# Patient Record
Sex: FEMALE | Race: WHITE | HISPANIC OR LATINO
[De-identification: names, ages, dates, MRNs, and addresses within clinical notes are randomized per-mention and may not be internally consistent; named-entity substitution may affect disease eponyms.]

---

## 2017-04-17 ENCOUNTER — RESULT REVIEW (OUTPATIENT)
Age: 27
End: 2017-04-17

## 2017-12-07 ENCOUNTER — TRANSCRIPTION ENCOUNTER (OUTPATIENT)
Age: 27
End: 2017-12-07

## 2017-12-07 ENCOUNTER — INPATIENT (INPATIENT)
Facility: HOSPITAL | Age: 27
LOS: 2 days | Discharge: HOME | End: 2017-12-10
Payer: COMMERCIAL

## 2017-12-07 VITALS — HEIGHT: 65 IN | WEIGHT: 216.05 LBS

## 2017-12-07 DIAGNOSIS — O26.893 OTHER SPECIFIED PREGNANCY RELATED CONDITIONS, THIRD TRIMESTER: ICD-10-CM

## 2017-12-07 DIAGNOSIS — O47.1 FALSE LABOR AT OR AFTER 37 COMPLETED WEEKS OF GESTATION: ICD-10-CM

## 2017-12-07 LAB
ABO RH CONFIRMATION: SIGNIFICANT CHANGE UP
APPEARANCE UR: CLEAR — SIGNIFICANT CHANGE UP
BASE EXCESS BLDCOA CALC-SCNC: -5.7 MMOL/L — LOW (ref -2–2)
BASE EXCESS BLDCOV CALC-SCNC: -4.2 MMOL/L — LOW (ref -2–2)
BASOPHILS # BLD AUTO: 0 K/UL — SIGNIFICANT CHANGE UP (ref 0–0.2)
BASOPHILS NFR BLD AUTO: 0.1 % — SIGNIFICANT CHANGE UP (ref 0–2)
BILIRUB UR-MCNC: NEGATIVE — SIGNIFICANT CHANGE UP
BLD GP AB SCN SERPL QL: SIGNIFICANT CHANGE UP
COLOR SPEC: YELLOW — SIGNIFICANT CHANGE UP
DIFF PNL FLD: NEGATIVE — SIGNIFICANT CHANGE UP
EOSINOPHIL # BLD AUTO: 0.1 K/UL — SIGNIFICANT CHANGE UP (ref 0–0.5)
EOSINOPHIL NFR BLD AUTO: 1 % — SIGNIFICANT CHANGE UP (ref 0–6)
GAS PNL BLDCOV: 7.25 — SIGNIFICANT CHANGE UP (ref 7.25–7.45)
GLUCOSE UR QL: NEGATIVE MG/DL — SIGNIFICANT CHANGE UP
HCO3 BLDCOA-SCNC: 18 MMOL/L — LOW (ref 21–29)
HCO3 BLDCOV-SCNC: 19 MMOL/L — LOW (ref 21–29)
HCT VFR BLD CALC: 36.7 % — LOW (ref 37–47)
HGB BLD-MCNC: 12.3 G/DL — SIGNIFICANT CHANGE UP (ref 12–16)
KETONES UR-MCNC: NEGATIVE — SIGNIFICANT CHANGE UP
LEUKOCYTE ESTERASE UR-ACNC: NEGATIVE — SIGNIFICANT CHANGE UP
LYMPHOCYTES # BLD AUTO: 2.2 K/UL — SIGNIFICANT CHANGE UP (ref 1–4.8)
LYMPHOCYTES # BLD AUTO: 23.5 % — SIGNIFICANT CHANGE UP (ref 20–55)
MCHC RBC-ENTMCNC: 26.7 PG — LOW (ref 27–31)
MCHC RBC-ENTMCNC: 33.5 G/DL — SIGNIFICANT CHANGE UP (ref 32–36)
MCV RBC AUTO: 79.8 FL — LOW (ref 81–99)
MONOCYTES # BLD AUTO: 0.7 K/UL — SIGNIFICANT CHANGE UP (ref 0–0.8)
MONOCYTES NFR BLD AUTO: 7.1 % — SIGNIFICANT CHANGE UP (ref 3–10)
NEUTROPHILS # BLD AUTO: 6.3 K/UL — SIGNIFICANT CHANGE UP (ref 1.8–8)
NEUTROPHILS NFR BLD AUTO: 67.7 % — SIGNIFICANT CHANGE UP (ref 37–73)
NITRITE UR-MCNC: NEGATIVE — SIGNIFICANT CHANGE UP
PCO2 BLDCOA: 59 MMHG — SIGNIFICANT CHANGE UP (ref 32–68)
PCO2 BLDCOV: 54.8 MMHG — HIGH (ref 29–53)
PH BLDCOA: 7.21 — SIGNIFICANT CHANGE UP (ref 7.18–7.38)
PH UR: 6 — SIGNIFICANT CHANGE UP (ref 5–8)
PLATELET # BLD AUTO: 236 K/UL — SIGNIFICANT CHANGE UP (ref 150–400)
PO2 BLDCOA: 13.3 MMHG — SIGNIFICANT CHANGE UP (ref 5.7–30.5)
PO2 BLDCOA: 18.2 MMHG — SIGNIFICANT CHANGE UP (ref 17–41)
PROT UR-MCNC: NEGATIVE MG/DL — SIGNIFICANT CHANGE UP
RBC # BLD: 4.6 M/UL — SIGNIFICANT CHANGE UP (ref 4.4–5.2)
RBC # FLD: 14.9 % — SIGNIFICANT CHANGE UP (ref 11–15.6)
SAO2 % BLDCOA: SIGNIFICANT CHANGE UP
SAO2 % BLDCOV: SIGNIFICANT CHANGE UP
SP GR SPEC: 1.01 — SIGNIFICANT CHANGE UP (ref 1.01–1.02)
UROBILINOGEN FLD QL: NEGATIVE MG/DL — SIGNIFICANT CHANGE UP
WBC # BLD: 9.4 K/UL — SIGNIFICANT CHANGE UP (ref 4.8–10.8)
WBC # FLD AUTO: 9.4 K/UL — SIGNIFICANT CHANGE UP (ref 4.8–10.8)

## 2017-12-07 PROCEDURE — 59514 CESAREAN DELIVERY ONLY: CPT | Mod: 80

## 2017-12-07 RX ORDER — OXYCODONE AND ACETAMINOPHEN 5; 325 MG/1; MG/1
2 TABLET ORAL EVERY 6 HOURS
Qty: 0 | Refills: 0 | Status: DISCONTINUED | OUTPATIENT
Start: 2017-12-07 | End: 2017-12-10

## 2017-12-07 RX ORDER — OXYTOCIN 10 UNIT/ML
333.33 VIAL (ML) INJECTION
Qty: 20 | Refills: 0 | Status: DISCONTINUED | OUTPATIENT
Start: 2017-12-07 | End: 2017-12-10

## 2017-12-07 RX ORDER — NALOXONE HYDROCHLORIDE 4 MG/.1ML
0.1 SPRAY NASAL
Qty: 0 | Refills: 0 | Status: DISCONTINUED | OUTPATIENT
Start: 2017-12-07 | End: 2017-12-10

## 2017-12-07 RX ORDER — SODIUM CHLORIDE 9 MG/ML
500 INJECTION, SOLUTION INTRAVENOUS ONCE
Qty: 0 | Refills: 0 | Status: COMPLETED | OUTPATIENT
Start: 2017-12-07 | End: 2017-12-07

## 2017-12-07 RX ORDER — DIPHENHYDRAMINE HCL 50 MG
25 CAPSULE ORAL EVERY 6 HOURS
Qty: 0 | Refills: 0 | Status: DISCONTINUED | OUTPATIENT
Start: 2017-12-07 | End: 2017-12-10

## 2017-12-07 RX ORDER — ACETAMINOPHEN 500 MG
650 TABLET ORAL EVERY 6 HOURS
Qty: 0 | Refills: 0 | Status: DISCONTINUED | OUTPATIENT
Start: 2017-12-07 | End: 2017-12-10

## 2017-12-07 RX ORDER — OXYCODONE AND ACETAMINOPHEN 5; 325 MG/1; MG/1
1 TABLET ORAL
Qty: 0 | Refills: 0 | Status: DISCONTINUED | OUTPATIENT
Start: 2017-12-07 | End: 2017-12-10

## 2017-12-07 RX ORDER — LANOLIN
1 OINTMENT (GRAM) TOPICAL
Qty: 0 | Refills: 0 | Status: DISCONTINUED | OUTPATIENT
Start: 2017-12-07 | End: 2017-12-10

## 2017-12-07 RX ORDER — SODIUM CHLORIDE 9 MG/ML
1000 INJECTION, SOLUTION INTRAVENOUS
Qty: 0 | Refills: 0 | Status: DISCONTINUED | OUTPATIENT
Start: 2017-12-07 | End: 2017-12-07

## 2017-12-07 RX ORDER — ACETAMINOPHEN 500 MG
1000 TABLET ORAL ONCE
Qty: 0 | Refills: 0 | Status: DISCONTINUED | OUTPATIENT
Start: 2017-12-07 | End: 2017-12-10

## 2017-12-07 RX ORDER — GLYCERIN ADULT
1 SUPPOSITORY, RECTAL RECTAL AT BEDTIME
Qty: 0 | Refills: 0 | Status: DISCONTINUED | OUTPATIENT
Start: 2017-12-07 | End: 2017-12-10

## 2017-12-07 RX ORDER — DIPHENHYDRAMINE HCL 50 MG
25 CAPSULE ORAL EVERY 4 HOURS
Qty: 0 | Refills: 0 | Status: DISCONTINUED | OUTPATIENT
Start: 2017-12-07 | End: 2017-12-10

## 2017-12-07 RX ORDER — IBUPROFEN 200 MG
600 TABLET ORAL EVERY 6 HOURS
Qty: 0 | Refills: 0 | Status: DISCONTINUED | OUTPATIENT
Start: 2017-12-07 | End: 2017-12-10

## 2017-12-07 RX ORDER — SODIUM CHLORIDE 9 MG/ML
1000 INJECTION, SOLUTION INTRAVENOUS ONCE
Qty: 0 | Refills: 0 | Status: COMPLETED | OUTPATIENT
Start: 2017-12-07 | End: 2017-12-07

## 2017-12-07 RX ORDER — ONDANSETRON 8 MG/1
4 TABLET, FILM COATED ORAL EVERY 6 HOURS
Qty: 0 | Refills: 0 | Status: DISCONTINUED | OUTPATIENT
Start: 2017-12-07 | End: 2017-12-07

## 2017-12-07 RX ORDER — SIMETHICONE 80 MG/1
80 TABLET, CHEWABLE ORAL EVERY 4 HOURS
Qty: 0 | Refills: 0 | Status: DISCONTINUED | OUTPATIENT
Start: 2017-12-07 | End: 2017-12-10

## 2017-12-07 RX ORDER — OXYTOCIN 10 UNIT/ML
41.67 VIAL (ML) INJECTION
Qty: 20 | Refills: 0 | Status: DISCONTINUED | OUTPATIENT
Start: 2017-12-07 | End: 2017-12-07

## 2017-12-07 RX ORDER — CITRIC ACID/SODIUM CITRATE 300-500 MG
30 SOLUTION, ORAL ORAL ONCE
Qty: 0 | Refills: 0 | Status: COMPLETED | OUTPATIENT
Start: 2017-12-07 | End: 2017-12-07

## 2017-12-07 RX ORDER — OXYTOCIN 10 UNIT/ML
41.67 VIAL (ML) INJECTION
Qty: 20 | Refills: 0 | Status: DISCONTINUED | OUTPATIENT
Start: 2017-12-07 | End: 2017-12-10

## 2017-12-07 RX ORDER — KETOROLAC TROMETHAMINE 30 MG/ML
30 SYRINGE (ML) INJECTION EVERY 6 HOURS
Qty: 0 | Refills: 0 | Status: DISCONTINUED | OUTPATIENT
Start: 2017-12-07 | End: 2017-12-08

## 2017-12-07 RX ORDER — FERROUS SULFATE 325(65) MG
325 TABLET ORAL DAILY
Qty: 0 | Refills: 0 | Status: DISCONTINUED | OUTPATIENT
Start: 2017-12-07 | End: 2017-12-10

## 2017-12-07 RX ORDER — KETOROLAC TROMETHAMINE 30 MG/ML
30 SYRINGE (ML) INJECTION ONCE
Qty: 0 | Refills: 0 | Status: DISCONTINUED | OUTPATIENT
Start: 2017-12-07 | End: 2017-12-07

## 2017-12-07 RX ORDER — ONDANSETRON 8 MG/1
4 TABLET, FILM COATED ORAL EVERY 6 HOURS
Qty: 0 | Refills: 0 | Status: DISCONTINUED | OUTPATIENT
Start: 2017-12-07 | End: 2017-12-10

## 2017-12-07 RX ORDER — TETANUS TOXOID, REDUCED DIPHTHERIA TOXOID AND ACELLULAR PERTUSSIS VACCINE, ADSORBED 5; 2.5; 8; 8; 2.5 [IU]/.5ML; [IU]/.5ML; UG/.5ML; UG/.5ML; UG/.5ML
0.5 SUSPENSION INTRAMUSCULAR ONCE
Qty: 0 | Refills: 0 | Status: COMPLETED | OUTPATIENT
Start: 2017-12-07

## 2017-12-07 RX ORDER — OXYTOCIN 10 UNIT/ML
333.33 VIAL (ML) INJECTION
Qty: 20 | Refills: 0 | Status: DISCONTINUED | OUTPATIENT
Start: 2017-12-07 | End: 2017-12-07

## 2017-12-07 RX ORDER — GENTAMICIN SULFATE 40 MG/ML
120 VIAL (ML) INJECTION ONCE
Qty: 0 | Refills: 0 | Status: DISCONTINUED | OUTPATIENT
Start: 2017-12-07 | End: 2017-12-07

## 2017-12-07 RX ORDER — SODIUM CHLORIDE 9 MG/ML
1000 INJECTION, SOLUTION INTRAVENOUS
Qty: 0 | Refills: 0 | Status: DISCONTINUED | OUTPATIENT
Start: 2017-12-07 | End: 2017-12-10

## 2017-12-07 RX ORDER — NALBUPHINE HYDROCHLORIDE 10 MG/ML
5 INJECTION, SOLUTION INTRAMUSCULAR; INTRAVENOUS; SUBCUTANEOUS EVERY 6 HOURS
Qty: 0 | Refills: 0 | Status: DISCONTINUED | OUTPATIENT
Start: 2017-12-07 | End: 2017-12-10

## 2017-12-07 RX ORDER — SODIUM CHLORIDE 9 MG/ML
500 INJECTION, SOLUTION INTRAVENOUS
Qty: 0 | Refills: 0 | Status: DISCONTINUED | OUTPATIENT
Start: 2017-12-07 | End: 2017-12-07

## 2017-12-07 RX ORDER — DOCUSATE SODIUM 100 MG
100 CAPSULE ORAL
Qty: 0 | Refills: 0 | Status: DISCONTINUED | OUTPATIENT
Start: 2017-12-07 | End: 2017-12-10

## 2017-12-07 RX ADMIN — SODIUM CHLORIDE 1000 MILLILITER(S): 9 INJECTION, SOLUTION INTRAVENOUS at 12:03

## 2017-12-07 RX ADMIN — Medication 30 MILLILITER(S): at 15:18

## 2017-12-07 RX ADMIN — Medication 100 MILLIGRAM(S): at 15:59

## 2017-12-07 RX ADMIN — Medication 125 MILLIUNIT(S)/MIN: at 18:57

## 2017-12-07 RX ADMIN — Medication 125 MILLIUNIT(S)/MIN: at 21:30

## 2017-12-07 RX ADMIN — SODIUM CHLORIDE 2000 MILLILITER(S): 9 INJECTION, SOLUTION INTRAVENOUS at 15:00

## 2017-12-07 RX ADMIN — SODIUM CHLORIDE 125 MILLILITER(S): 9 INJECTION, SOLUTION INTRAVENOUS at 09:24

## 2017-12-07 RX ADMIN — Medication 200 MILLIGRAM(S): at 16:31

## 2017-12-07 RX ADMIN — SODIUM CHLORIDE 125 MILLILITER(S): 9 INJECTION, SOLUTION INTRAVENOUS at 04:30

## 2017-12-07 RX ADMIN — Medication 125 MILLIUNIT(S)/MIN: at 17:00

## 2017-12-07 RX ADMIN — Medication 1000 MILLIUNIT(S)/MIN: at 16:31

## 2017-12-08 LAB
BASOPHILS # BLD AUTO: 0 K/UL — SIGNIFICANT CHANGE UP (ref 0–0.2)
BASOPHILS NFR BLD AUTO: 0.1 % — SIGNIFICANT CHANGE UP (ref 0–2)
EOSINOPHIL # BLD AUTO: 0.1 K/UL — SIGNIFICANT CHANGE UP (ref 0–0.5)
EOSINOPHIL NFR BLD AUTO: 0.9 % — SIGNIFICANT CHANGE UP (ref 0–6)
HCT VFR BLD CALC: 33.9 % — LOW (ref 37–47)
HGB BLD-MCNC: 10.8 G/DL — LOW (ref 12–16)
LYMPHOCYTES # BLD AUTO: 2 K/UL — SIGNIFICANT CHANGE UP (ref 1–4.8)
LYMPHOCYTES # BLD AUTO: 20.1 % — SIGNIFICANT CHANGE UP (ref 20–55)
MCHC RBC-ENTMCNC: 25.8 PG — LOW (ref 27–31)
MCHC RBC-ENTMCNC: 31.9 G/DL — LOW (ref 32–36)
MCV RBC AUTO: 80.9 FL — LOW (ref 81–99)
MONOCYTES # BLD AUTO: 0.7 K/UL — SIGNIFICANT CHANGE UP (ref 0–0.8)
MONOCYTES NFR BLD AUTO: 6.8 % — SIGNIFICANT CHANGE UP (ref 3–10)
NEUTROPHILS # BLD AUTO: 7.2 K/UL — SIGNIFICANT CHANGE UP (ref 1.8–8)
NEUTROPHILS NFR BLD AUTO: 72 % — SIGNIFICANT CHANGE UP (ref 37–73)
PLATELET # BLD AUTO: 209 K/UL — SIGNIFICANT CHANGE UP (ref 150–400)
RBC # BLD: 4.19 M/UL — LOW (ref 4.4–5.2)
RBC # FLD: 14.9 % — SIGNIFICANT CHANGE UP (ref 11–15.6)
T PALLIDUM AB TITR SER: NEGATIVE — SIGNIFICANT CHANGE UP
WBC # BLD: 9.9 K/UL — SIGNIFICANT CHANGE UP (ref 4.8–10.8)
WBC # FLD AUTO: 9.9 K/UL — SIGNIFICANT CHANGE UP (ref 4.8–10.8)

## 2017-12-08 RX ORDER — TETANUS TOXOID, REDUCED DIPHTHERIA TOXOID AND ACELLULAR PERTUSSIS VACCINE, ADSORBED 5; 2.5; 8; 8; 2.5 [IU]/.5ML; [IU]/.5ML; UG/.5ML; UG/.5ML; UG/.5ML
0.5 SUSPENSION INTRAMUSCULAR ONCE
Qty: 0 | Refills: 0 | Status: COMPLETED | OUTPATIENT
Start: 2017-12-08 | End: 2017-12-08

## 2017-12-08 RX ADMIN — Medication 100 MILLIGRAM(S): at 00:22

## 2017-12-08 RX ADMIN — Medication 30 MILLIGRAM(S): at 06:06

## 2017-12-08 RX ADMIN — SODIUM CHLORIDE 125 MILLILITER(S): 9 INJECTION, SOLUTION INTRAVENOUS at 00:22

## 2017-12-08 RX ADMIN — Medication 1 TABLET(S): at 18:28

## 2017-12-08 RX ADMIN — Medication 30 MILLIGRAM(S): at 06:20

## 2017-12-08 RX ADMIN — Medication 30 MILLIGRAM(S): at 00:22

## 2017-12-08 RX ADMIN — Medication 100 MILLIGRAM(S): at 09:04

## 2017-12-08 RX ADMIN — Medication 600 MILLIGRAM(S): at 18:28

## 2017-12-08 RX ADMIN — Medication 30 MILLIGRAM(S): at 00:36

## 2017-12-08 RX ADMIN — Medication 325 MILLIGRAM(S): at 18:28

## 2017-12-08 RX ADMIN — TETANUS TOXOID, REDUCED DIPHTHERIA TOXOID AND ACELLULAR PERTUSSIS VACCINE, ADSORBED 0.5 MILLILITER(S): 5; 2.5; 8; 8; 2.5 SUSPENSION INTRAMUSCULAR at 22:57

## 2017-12-08 NOTE — PROGRESS NOTE ADULT - ASSESSMENT
26yo  POD#1 s/p  delivery, stable.     1. Continue with postpartum/postop care.   2. GI: regular diet  3. : d/c gomez, due to void  4. Neuro: Pain PRN medications  5. DVT ppx: ambulation  6. Anticipate discharge on POD# 3 or 4 if meets criteria 28yo  POD#1 s/p  delivery, stable.     1. Continue with postpartum/postop care.   2. GI: regular diet  3. : d/c gomez, due to void  4. Neuro: Pain PRN medications  5. DVT ppx: ambulation  6. Anticipate discharge on POD# 3 or 4 if meets criteria     Coverage attending addendum    Pt seen; doing well; desires circ; risks of circ discussed with parents including risk of bleeding and infection

## 2017-12-09 ENCOUNTER — TRANSCRIPTION ENCOUNTER (OUTPATIENT)
Age: 27
End: 2017-12-09

## 2017-12-09 RX ORDER — IBUPROFEN 200 MG
1 TABLET ORAL
Qty: 120 | Refills: 0
Start: 2017-12-09 | End: 2018-01-08

## 2017-12-09 RX ADMIN — Medication 1 TABLET(S): at 10:33

## 2017-12-09 RX ADMIN — Medication 600 MILLIGRAM(S): at 02:42

## 2017-12-09 RX ADMIN — Medication 600 MILLIGRAM(S): at 16:42

## 2017-12-09 RX ADMIN — Medication 600 MILLIGRAM(S): at 17:40

## 2017-12-09 RX ADMIN — Medication 600 MILLIGRAM(S): at 10:33

## 2017-12-09 RX ADMIN — Medication 325 MILLIGRAM(S): at 10:33

## 2017-12-09 RX ADMIN — Medication 600 MILLIGRAM(S): at 03:42

## 2017-12-09 RX ADMIN — Medication 600 MILLIGRAM(S): at 11:15

## 2017-12-09 NOTE — DISCHARGE NOTE OB - CARE PROVIDER_API CALL
Yesy Montalvo), Obstetrics and Gynecology  [address] [place] [address]  Phone: [phone]  Fax: [phone]

## 2017-12-09 NOTE — DISCHARGE NOTE OB - CARE PLAN
Principal Discharge DX:	 delivery delivered  Goal:	pain free  Instructions for follow-up, activity and diet:	Please follow up in our office in 5 days for staple removal.  Please call sooner if there are any additional concerns.

## 2017-12-09 NOTE — DISCHARGE NOTE OB - PATIENT PORTAL LINK FT
“You can access the Follow[facility] Patient Portal, offered by [facility], by registering with the following website: [email or web address]”

## 2017-12-09 NOTE — PROGRESS NOTE ADULT - ASSESSMENT
A/P:  Patient is a 28yo  now POD#2 s/p  section   -Stable  -Voiding, tolerating PO, bowel function nml   -Advance care as tolerated   -Continue routine postpartum/postoperative care and education.

## 2017-12-09 NOTE — DISCHARGE NOTE OB - HOSPITAL COURSE
Patient is a 26yo  who delivered via primary  section. She was transferred to postpartum unit without complications during her stay. Upon discharge she is voiding, tolerating PO, ambulating, and pain is controlled.

## 2017-12-09 NOTE — DISCHARGE NOTE OB - PLAN OF CARE
pain free Please follow up in our office in 5 days for staple removal.  Please call sooner if there are any additional concerns.

## 2017-12-09 NOTE — DISCHARGE NOTE OB - MEDICATION SUMMARY - MEDICATIONS TO TAKE
I will START or STAY ON the medications listed below when I get home from the hospital:    ibuprofen 600 mg oral tablet  -- 1 tab(s) by mouth every 6 hours, As needed, Mild pain or headache  -- Indication: For moderate pain    oxyCODONE-acetaminophen 5 mg-325 mg oral tablet  -- 1 tab(s) by mouth every 6 hours MDD:4  -- Caution federal law prohibits the transfer of this drug to any person other  than the person for whom it was prescribed.  May cause drowsiness.  Alcohol may intensify this effect.  Use care when operating dangerous machinery.  This prescription cannot be refilled.  This product contains acetaminophen.  Do not use  with any other product containing acetaminophen to prevent possible liver damage.  Using more of this medication than prescribed may cause serious breathing problems.    -- Indication: For severe pain    Prenatal Multivitamins with Folic Acid 1 mg oral tablet  -- 1 tab(s) by mouth once a day  -- Indication: For home med

## 2017-12-10 VITALS — SYSTOLIC BLOOD PRESSURE: 128 MMHG | DIASTOLIC BLOOD PRESSURE: 76 MMHG | TEMPERATURE: 98 F

## 2017-12-10 PROCEDURE — 86780 TREPONEMA PALLIDUM: CPT

## 2017-12-10 PROCEDURE — 36415 COLL VENOUS BLD VENIPUNCTURE: CPT

## 2017-12-10 PROCEDURE — 86850 RBC ANTIBODY SCREEN: CPT

## 2017-12-10 PROCEDURE — 86901 BLOOD TYPING SEROLOGIC RH(D): CPT

## 2017-12-10 PROCEDURE — 85027 COMPLETE CBC AUTOMATED: CPT

## 2017-12-10 PROCEDURE — 82803 BLOOD GASES ANY COMBINATION: CPT

## 2017-12-10 PROCEDURE — 90715 TDAP VACCINE 7 YRS/> IM: CPT

## 2017-12-10 PROCEDURE — 81003 URINALYSIS AUTO W/O SCOPE: CPT

## 2017-12-10 PROCEDURE — 86900 BLOOD TYPING SEROLOGIC ABO: CPT

## 2017-12-10 RX ADMIN — Medication 325 MILLIGRAM(S): at 11:06

## 2017-12-10 RX ADMIN — Medication 600 MILLIGRAM(S): at 00:56

## 2017-12-10 RX ADMIN — Medication 600 MILLIGRAM(S): at 01:56

## 2017-12-10 RX ADMIN — Medication 600 MILLIGRAM(S): at 11:11

## 2017-12-10 RX ADMIN — Medication 600 MILLIGRAM(S): at 12:00

## 2017-12-10 RX ADMIN — Medication 1 TABLET(S): at 11:06

## 2017-12-10 NOTE — PROGRESS NOTE ADULT - SUBJECTIVE AND OBJECTIVE BOX
POD #2    S: patient doing well, no complaints, tolerating diet, pain controlled    O: Vital Signs Last 24 Hrs  T(C): 36.8 (08 Dec 2017 19:00), Max: 36.8 (08 Dec 2017 16:45)  T(F): 98.2 (08 Dec 2017 19:00), Max: 98.3 (08 Dec 2017 16:45)  HR: 73 (08 Dec 2017 19:00) (73 - 93)  BP: 120/72 (08 Dec 2017 19:00) (116/69 - 120/72)  BP(mean): --  RR: 18 (08 Dec 2017 19:00) (18 - 18)  SpO2: --               breasts - not engorged       abdomen - soft, nontender       incision - clean, dry, intact       fundus - firm       lochia - minimal       extremities - no calf tenderness                            10.8   9.9   )-----------( 209      ( 08 Dec 2017 07:30 )             33.9               A: POD #2 S/P C/S    P: continue present management
POD# 3  s/p       section    s: no complaints; dmitri reg diet    vital signs  Vital Signs Last 24 Hrs  T(C): 36.7 (10 Dec 2017 10:24), Max: 37 (10 Dec 2017 08:46)  T(F): 98.1 (10 Dec 2017 10:24), Max: 98.6 (10 Dec 2017 08:46)  HR: 85 (10 Dec 2017 08:46) (78 - 95)  BP: 128/76 (10 Dec 2017 10:24) (128/76 - 139/64)  BP(mean): --  RR: 20 (10 Dec 2017 08:46) (16 - 20)  SpO2: --    abd: soft  inc:c/d/i          cbc:
Patient is a 26yo  now POD#2 s/p  section    S:    The patient has no complaints.  She is ambulating and tolerating diet   + flatus/+BM     O:    T(C): 36.8 (17 @ 19:00), Max: 36.9 (17 @ 08:12)  HR: 73 (17 @ 19:00) (73 - 93)  BP: 120/72 (17 @ 19:00) (107/71 - 120/72)  RR: 18 (17 @ 19:00) (18 - 18)  Abdomen:  soft, non-tender, non-distended, +bowel sounds.  Uterus:  Fundus firm below umbilicus  Incision:  Clean/dry/intact  VE:  +lochia  Ext:  Non-tender.                          10.8   9.9   )-----------( 209      ( 08 Dec 2017 07:30 )             33.9
S/P  with spinal anesthesia POD#1   No complaints, VSS, MAEx4, ambulating.   Denies any PONV, headache or paresthesia.   No numbness or weakness noted.  Pain management satisfactory.   No apparent anesthesia complications noted.
S/P  with spinal anesthesia POD#1   No complaints, VSS, MAEx4, ambulating.   Denies any PONV, headache or paresthesia.   No numbness or weakness noted.  Pain management satisfactory.   No apparent anesthesia complications noted.
Postpartum Note,  Section  She is a  27y woman who is now post-operative day: 1    Subjective:  Patient was seen and examined at bedside. No acute events.   The patient feels well.  Patient is pending d/c of gomez. +Flatus, -BM.   She is tolerating regular diet.  She denies nausea and vomiting.  Her pain is controlled.  She reports normal postpartum bleeding.  She is breastfeeding.    Vital Signs Last 24 Hrs  T(C): 36.8 (08 Dec 2017 05:45), Max: 36.8 (07 Dec 2017 19:10)  T(F): 98.2 (08 Dec 2017 05:45), Max: 98.2 (07 Dec 2017 19:10)  HR: 74 (08 Dec 2017 05:45) (71 - 108)  BP: 128/78 (08 Dec 2017 05:45) (99/41 - 130/67)  BP(mean): --  RR: 19 (08 Dec 2017 05:45) (12 - 20)  SpO2: 98% (07 Dec 2017 19:10) (96% - 99%)    Gen: NAD  Breast: Soft, nontender, not engorged.  Abdomen: Soft, nontender, no distension , firm uterine fundus at umbilicus.  Incision: Clean, dry, and intact with steri strips,had her bowel movement  Pelvic: Normal lochia noted  Ext: No calf tenderness    LABS:                        12.3   9.4   )-----------( 236      ( 07 Dec 2017 04:51 )             36.7     Rubella status: immune    no known allergies

## 2017-12-12 ENCOUNTER — TRANSCRIPTION ENCOUNTER (OUTPATIENT)
Age: 27
End: 2017-12-12

## 2017-12-16 ENCOUNTER — EMERGENCY (EMERGENCY)
Facility: HOSPITAL | Age: 27
LOS: 1 days | Discharge: HOME | End: 2017-12-16
Attending: EMERGENCY MEDICINE | Admitting: EMERGENCY MEDICINE
Payer: COMMERCIAL

## 2017-12-16 VITALS
DIASTOLIC BLOOD PRESSURE: 82 MMHG | HEART RATE: 73 BPM | TEMPERATURE: 98 F | HEIGHT: 65 IN | OXYGEN SATURATION: 97 % | SYSTOLIC BLOOD PRESSURE: 125 MMHG | RESPIRATION RATE: 18 BRPM | WEIGHT: 175.05 LBS

## 2017-12-16 VITALS
DIASTOLIC BLOOD PRESSURE: 82 MMHG | RESPIRATION RATE: 17 BRPM | HEART RATE: 70 BPM | SYSTOLIC BLOOD PRESSURE: 132 MMHG | OXYGEN SATURATION: 98 %

## 2017-12-16 DIAGNOSIS — Z90.49 ACQUIRED ABSENCE OF OTHER SPECIFIED PARTS OF DIGESTIVE TRACT: ICD-10-CM

## 2017-12-16 DIAGNOSIS — Z88.1 ALLERGY STATUS TO OTHER ANTIBIOTIC AGENTS STATUS: ICD-10-CM

## 2017-12-16 DIAGNOSIS — Z79.891 LONG TERM (CURRENT) USE OF OPIATE ANALGESIC: ICD-10-CM

## 2017-12-16 DIAGNOSIS — T78.40XA ALLERGY, UNSPECIFIED, INITIAL ENCOUNTER: ICD-10-CM

## 2017-12-16 DIAGNOSIS — Z79.899 OTHER LONG TERM (CURRENT) DRUG THERAPY: ICD-10-CM

## 2017-12-16 DIAGNOSIS — Z79.1 LONG TERM (CURRENT) USE OF NON-STEROIDAL ANTI-INFLAMMATORIES (NSAID): ICD-10-CM

## 2017-12-16 DIAGNOSIS — Z90.49 ACQUIRED ABSENCE OF OTHER SPECIFIED PARTS OF DIGESTIVE TRACT: Chronic | ICD-10-CM

## 2017-12-16 DIAGNOSIS — Z88.0 ALLERGY STATUS TO PENICILLIN: ICD-10-CM

## 2017-12-16 DIAGNOSIS — R21 RASH AND OTHER NONSPECIFIC SKIN ERUPTION: ICD-10-CM

## 2017-12-16 DIAGNOSIS — Z98.890 OTHER SPECIFIED POSTPROCEDURAL STATES: ICD-10-CM

## 2017-12-16 LAB
ANION GAP SERPL CALC-SCNC: 13 MMOL/L — SIGNIFICANT CHANGE UP (ref 5–17)
BUN SERPL-MCNC: 13 MG/DL — SIGNIFICANT CHANGE UP (ref 8–20)
CALCIUM SERPL-MCNC: 9.3 MG/DL — SIGNIFICANT CHANGE UP (ref 8.6–10.2)
CHLORIDE SERPL-SCNC: 105 MMOL/L — SIGNIFICANT CHANGE UP (ref 98–107)
CO2 SERPL-SCNC: 23 MMOL/L — SIGNIFICANT CHANGE UP (ref 22–29)
CREAT SERPL-MCNC: 0.76 MG/DL — SIGNIFICANT CHANGE UP (ref 0.5–1.3)
GLUCOSE SERPL-MCNC: 97 MG/DL — SIGNIFICANT CHANGE UP (ref 70–115)
POTASSIUM SERPL-MCNC: 4.3 MMOL/L — SIGNIFICANT CHANGE UP (ref 3.5–5.3)
POTASSIUM SERPL-SCNC: 4.3 MMOL/L — SIGNIFICANT CHANGE UP (ref 3.5–5.3)
SODIUM SERPL-SCNC: 141 MMOL/L — SIGNIFICANT CHANGE UP (ref 135–145)

## 2017-12-16 PROCEDURE — 80048 BASIC METABOLIC PNL TOTAL CA: CPT

## 2017-12-16 PROCEDURE — 71275 CT ANGIOGRAPHY CHEST: CPT

## 2017-12-16 PROCEDURE — 96374 THER/PROPH/DIAG INJ IV PUSH: CPT | Mod: XU

## 2017-12-16 PROCEDURE — 99284 EMERGENCY DEPT VISIT MOD MDM: CPT

## 2017-12-16 PROCEDURE — 71275 CT ANGIOGRAPHY CHEST: CPT | Mod: 26

## 2017-12-16 PROCEDURE — 96376 TX/PRO/DX INJ SAME DRUG ADON: CPT | Mod: XU

## 2017-12-16 PROCEDURE — 99284 EMERGENCY DEPT VISIT MOD MDM: CPT | Mod: 25

## 2017-12-16 PROCEDURE — 96375 TX/PRO/DX INJ NEW DRUG ADDON: CPT | Mod: XU

## 2017-12-16 PROCEDURE — 36415 COLL VENOUS BLD VENIPUNCTURE: CPT

## 2017-12-16 RX ORDER — DIPHENHYDRAMINE HCL 50 MG
25 CAPSULE ORAL ONCE
Qty: 0 | Refills: 0 | Status: COMPLETED | OUTPATIENT
Start: 2017-12-16 | End: 2017-12-16

## 2017-12-16 RX ORDER — FAMOTIDINE 10 MG/ML
20 INJECTION INTRAVENOUS ONCE
Qty: 0 | Refills: 0 | Status: COMPLETED | OUTPATIENT
Start: 2017-12-16 | End: 2017-12-16

## 2017-12-16 RX ORDER — SODIUM CHLORIDE 9 MG/ML
3 INJECTION INTRAMUSCULAR; INTRAVENOUS; SUBCUTANEOUS ONCE
Qty: 0 | Refills: 0 | Status: COMPLETED | OUTPATIENT
Start: 2017-12-16 | End: 2017-12-16

## 2017-12-16 RX ADMIN — Medication 125 MILLIGRAM(S): at 14:22

## 2017-12-16 RX ADMIN — Medication 25 MILLIGRAM(S): at 14:22

## 2017-12-16 RX ADMIN — Medication 25 MILLIGRAM(S): at 18:36

## 2017-12-16 RX ADMIN — SODIUM CHLORIDE 3 MILLILITER(S): 9 INJECTION INTRAMUSCULAR; INTRAVENOUS; SUBCUTANEOUS at 14:34

## 2017-12-16 RX ADMIN — FAMOTIDINE 20 MILLIGRAM(S): 10 INJECTION INTRAVENOUS at 14:23

## 2017-12-16 NOTE — ED ADULT NURSE NOTE - CHIEF COMPLAINT QUOTE
Pt axox3 s/p   2017 at [facility]. C/O right sided breast pain with inhalation and is having a reaction to ibuprofen starting  2017. Pt presenting with generalized hives on abdomen /chest area . Pt states she took x2 benadryl to tall 50mg today at 1200.

## 2017-12-16 NOTE — ED STATDOCS - PROGRESS NOTE DETAILS
PA NOTE: Pt seen by intake physician and hpi/orders/plan reviewed. PT presenting to ED with complaints of pain on right side of chest upon deep inspiration - pt gave birth 1 week ago  PE: GEN: Awake, alert,  NAD,  EYES: PERRL CARDIAC: Reg rate and rhythm, S1,S2, RRR  RESP: No distress noted. Lungs CTA bilaterally no wheeze, ronchi, rales. ABD: soft,  non-tender, no guarding. . NEURO: AOx3, no focal deficits   PLAN: CT angio to r/o PE PA NOTE: Pt seen by intake physician and hpi/orders/plan reviewed. PT presenting to ED with complaints of pain on right side of chest upon deep inspiration - pt gave birth 1 week ago  Patient also c/o itchy rash on low abdomen x 3 days.  She states that rash developed after started keflex for cellulitis.  Patient has had allergic reaction to penicillin in the past.  Denies difficulty breathing or swallowing.  PE: GEN: Awake, alert,  NAD,  EYES: PERRL CARDIAC: Reg rate and rhythm, S1,S2, RRR  RESP: No distress noted. Lungs CTA bilaterally no wheeze, ronchi, rales. ABD: soft,  non-tender, no guarding. . NEURO: AOx3, no focal deficits   PLAN: CT angio to r/o PE pt states that her rash is spreading up the abdomen - denies difficulty breathing or swallowing

## 2017-12-16 NOTE — ED ADULT NURSE NOTE - OBJECTIVE STATEMENT
Pt presents to ED A&Ox3 with multiple medical complaints. Pt reports she had  on Thursday and had allergic reaction to tefla adhesive tape surrounding her incision site where she developed blisters. Pt reports she was placed on keflex and then had subsequent allergic reaction to the ABX. Pt has hives diffusely across abdomen, back and B/L breast. Pt appears uncomfortable, MD at bedside. Called placed to Yesy Montalvo. Pt presents to ED A&Ox3, reports she had  on Thursday and had allergic reaction to tefla adhesive tape surrounding her incision site where she developed blisters. Pt reports she was placed on keflex and then had subsequent allergic reaction to the ABX. Pt has hives diffusely across abdomen, back and B/L breast. Pt appears uncomfortable, MD at bedside. Called placed to Yesy Montalvo.

## 2017-12-16 NOTE — ED STATDOCS - OBJECTIVE STATEMENT
28 y/o F pt presents to ED s/p  2017 c/o diffuse itchy rash to abdomen and breast. Pt states she noticed blister rash on incision site; she had reaction to tape. Called her OB Dr. Montalvo placed on Keflex. Once she started taking the Keflex rash started, however blisters are healing. Pt is breastfeeding. No difficulty swallowing, no SOB. no further complaints at this time.

## 2017-12-16 NOTE — ED STATDOCS - ATTENDING CONTRIBUTION TO CARE
I, Silvia Gonzalez, performed the initial face to face bedside interview with this patient regarding history of present illness, review of symptoms and relevant past medical, social and family history.  I completed an independent physical examination.  I was the initial provider who evaluated this patient. I have signed out the follow up of any pending tests (i.e. labs, radiological studies) to the ACP.  I have communicated the patient’s plan of care and disposition with the ACP.  The history, relevant review of systems, past medical and surgical history, medical decision making, and physical examination was documented by the scribe in my presence and I attest to the accuracy of the documentation.

## 2017-12-16 NOTE — ED ADULT TRIAGE NOTE - CHIEF COMPLAINT QUOTE
Pt axox3 s/p   2017 at [facility]. C/O right sided breast pain with inhalation and is having a reaction to ibuprofen starting  2017. Pt presenting with generalized hives on abdomen /chest area . Pt states she took x2 benadryl to tall 50mg today at 1200.

## 2017-12-16 NOTE — ED STATDOCS - MEDICAL DECISION MAKING DETAILS
Allergic reaction; hives. Treat and re-evaluate pt Allergic reaction to keflex  Treat and re-evaluate pt. Switch abx to clindamycin as discussed with Dr. Montalvo (OB)

## 2018-01-20 ENCOUNTER — RESULT REVIEW (OUTPATIENT)
Age: 28
End: 2018-01-20

## 2018-12-11 ENCOUNTER — TRANSCRIPTION ENCOUNTER (OUTPATIENT)
Age: 28
End: 2018-12-11

## 2019-01-15 ENCOUNTER — RESULT REVIEW (OUTPATIENT)
Age: 29
End: 2019-01-15

## 2019-02-10 ENCOUNTER — TRANSCRIPTION ENCOUNTER (OUTPATIENT)
Age: 29
End: 2019-02-10

## 2019-03-25 NOTE — ED ADULT NURSE NOTE - NS ED NURSE LEVEL OF CONSCIOUSNESS SPEECH
States this was D/C on 1-9-2019 by Varsha Mcdonough MA. The D/C reason was Therapy Completed.      Please advise.   Speaking Coherently

## 2019-04-28 ENCOUNTER — OUTPATIENT (OUTPATIENT)
Dept: OUTPATIENT SERVICES | Facility: HOSPITAL | Age: 29
LOS: 1 days | End: 2019-04-28
Payer: COMMERCIAL

## 2019-04-28 VITALS — DIASTOLIC BLOOD PRESSURE: 63 MMHG | HEART RATE: 86 BPM | SYSTOLIC BLOOD PRESSURE: 95 MMHG

## 2019-04-28 VITALS — SYSTOLIC BLOOD PRESSURE: 113 MMHG | DIASTOLIC BLOOD PRESSURE: 64 MMHG | HEART RATE: 86 BPM

## 2019-04-28 DIAGNOSIS — Z90.49 ACQUIRED ABSENCE OF OTHER SPECIFIED PARTS OF DIGESTIVE TRACT: Chronic | ICD-10-CM

## 2019-04-28 DIAGNOSIS — K08.409 PARTIAL LOSS OF TEETH, UNSPECIFIED CAUSE, UNSPECIFIED CLASS: Chronic | ICD-10-CM

## 2019-04-28 DIAGNOSIS — O47.03 FALSE LABOR BEFORE 37 COMPLETED WEEKS OF GESTATION, THIRD TRIMESTER: ICD-10-CM

## 2019-04-28 PROCEDURE — G0463: CPT

## 2019-04-28 PROCEDURE — 59025 FETAL NON-STRESS TEST: CPT

## 2019-04-28 PROCEDURE — 59050 FETAL MONITOR W/REPORT: CPT

## 2019-04-28 NOTE — OB PROVIDER TRIAGE NOTE - HISTORY OF PRESENT ILLNESS
Patient is a 29yo  at 28 weeks and 1 day coming in with LUQ abdominal pain. Patient reports having diarrhea and cramping. Denies cxts, LOF, VB. +FM. Denies N/V, fevers.   Pregnancy uncomplicated.     PMH/PSH: T&A, deisy, CS  OBhx: CSX1    FHT: reactive   [place]: no cxts    A/P: Patient is a 29yo  at 28 weeks and 1 day coming in with LUQ abdominal pain.   FHT reactive without cxts, patient is not N/V  Will d/c home and recommend PO hydration and tylenol PRN for pain   Advised to call/return if develops N/V, fevers, of if pain becomes worse    D/W Dr. Montes

## 2019-04-28 NOTE — OB RN TRIAGE NOTE - NS_TRIAGETIMEOFATTENDNOTIFICATION_OBGYN_ALL_OB_DT
LM FOR THE PT, WOULD LIKE TO KNOW WHEN SHE IS LOOKING TO HAVE THE PROCEDURE DONE.   
Muna Huynh   Preferred Name:   None  Female, 49 year old, 1968  Phone:   *M:[phone]  Last Weight:   71.2 kg  PCP:   Dean Shields MD  Need Interp:   No  Language:   English  Allergies  Gabapentin  Nortriptyline  Wool Alcohol [Lanolin]  Primary Ins:   MEDICARE  MRN:   [id number]  my[place]:   Active  Next Appt With Me:   None  Last Appt With Me:       PLEASE ADJUST AND POST OP   Received: Today   Message Contents   Jas Wren Ob Reception Message Pool Wa/k   Cc: Tierra STEVENSON,     PLEASE ADJUST DR. MCKENZIE AND DR HUMMEL SCHEDULE ON 5/8, THEY WILL BE IN SURGERY AT 1015AM FOR 3HRS.   ALSO, PLEASE SCHEDULE THE PT WITH DR. HUMMEL FOR A 2 WEEK POST OP APPT.     THANK YOU,     JAS        
SPOKE WITH THE PT WHO AGREED TO DATE 5/8 AT 1015AM ARRIVAL 815AM    MAILED LETTER    SCHEDULED WITH KARMEN IN SURG SCHEDULING   
You Cooper MD at 3/9/2018  4:30 PM     Status: Signed      Recommending surgery be scheduled:next available   SHELLEY: No SHELLEY on file.  - Procedure(s): Lap total hyst, tubes, ovary, uterus <250 gm - [id number]  - Facility: [facility]  - Length of Procedure: 2 hours  - Assist: Any Available  - Type of Admit: Day Surgery  - PreOp Visit: No  - Medical Clearance from Primary Care Provider: No  - Bowel Prep Needed? no  - Special Equipment: No     - Post-op Appointment: 2 weeks  - Scheduling Comments: rOBOT          
28-Apr-2019 09:20

## 2019-05-01 ENCOUNTER — EMERGENCY (EMERGENCY)
Facility: HOSPITAL | Age: 29
LOS: 1 days | Discharge: HOME | End: 2019-05-01
Attending: EMERGENCY MEDICINE
Payer: COMMERCIAL

## 2019-05-01 ENCOUNTER — OUTPATIENT (OUTPATIENT)
Dept: OUTPATIENT SERVICES | Facility: HOSPITAL | Age: 29
LOS: 1 days | End: 2019-05-01
Payer: COMMERCIAL

## 2019-05-01 VITALS
TEMPERATURE: 99 F | SYSTOLIC BLOOD PRESSURE: 116 MMHG | HEIGHT: 65 IN | HEART RATE: 82 BPM | RESPIRATION RATE: 18 BRPM | OXYGEN SATURATION: 98 % | WEIGHT: 203.05 LBS | DIASTOLIC BLOOD PRESSURE: 58 MMHG

## 2019-05-01 VITALS
HEART RATE: 75 BPM | TEMPERATURE: 98 F | DIASTOLIC BLOOD PRESSURE: 64 MMHG | SYSTOLIC BLOOD PRESSURE: 128 MMHG | RESPIRATION RATE: 18 BRPM

## 2019-05-01 DIAGNOSIS — Z90.49 ACQUIRED ABSENCE OF OTHER SPECIFIED PARTS OF DIGESTIVE TRACT: Chronic | ICD-10-CM

## 2019-05-01 DIAGNOSIS — Z90.89 ACQUIRED ABSENCE OF OTHER ORGANS: Chronic | ICD-10-CM

## 2019-05-01 DIAGNOSIS — K08.409 PARTIAL LOSS OF TEETH, UNSPECIFIED CAUSE, UNSPECIFIED CLASS: Chronic | ICD-10-CM

## 2019-05-01 DIAGNOSIS — O47.02 FALSE LABOR BEFORE 37 COMPLETED WEEKS OF GESTATION, SECOND TRIMESTER: ICD-10-CM

## 2019-05-01 LAB
ALBUMIN SERPL ELPH-MCNC: 3.8 G/DL — SIGNIFICANT CHANGE UP (ref 3.3–5.2)
ALP SERPL-CCNC: 89 U/L — SIGNIFICANT CHANGE UP (ref 40–120)
ALT FLD-CCNC: 26 U/L — SIGNIFICANT CHANGE UP
ANION GAP SERPL CALC-SCNC: 14 MMOL/L — SIGNIFICANT CHANGE UP (ref 5–17)
APPEARANCE UR: CLEAR — SIGNIFICANT CHANGE UP
AST SERPL-CCNC: 18 U/L — SIGNIFICANT CHANGE UP
BASOPHILS # BLD AUTO: 0 K/UL — SIGNIFICANT CHANGE UP (ref 0–0.2)
BASOPHILS NFR BLD AUTO: 0.1 % — SIGNIFICANT CHANGE UP (ref 0–2)
BILIRUB SERPL-MCNC: <0.2 MG/DL — LOW (ref 0.4–2)
BILIRUB UR-MCNC: NEGATIVE — SIGNIFICANT CHANGE UP
BUN SERPL-MCNC: 8 MG/DL — SIGNIFICANT CHANGE UP (ref 8–20)
CALCIUM SERPL-MCNC: 9.8 MG/DL — SIGNIFICANT CHANGE UP (ref 8.6–10.2)
CHLORIDE SERPL-SCNC: 103 MMOL/L — SIGNIFICANT CHANGE UP (ref 98–107)
CO2 SERPL-SCNC: 21 MMOL/L — LOW (ref 22–29)
COLOR SPEC: YELLOW — SIGNIFICANT CHANGE UP
CREAT SERPL-MCNC: 0.51 MG/DL — SIGNIFICANT CHANGE UP (ref 0.5–1.3)
DIFF PNL FLD: ABNORMAL
EOSINOPHIL # BLD AUTO: 0.1 K/UL — SIGNIFICANT CHANGE UP (ref 0–0.5)
EOSINOPHIL NFR BLD AUTO: 1.8 % — SIGNIFICANT CHANGE UP (ref 0–6)
EPI CELLS # UR: SIGNIFICANT CHANGE UP
GLUCOSE SERPL-MCNC: 90 MG/DL — SIGNIFICANT CHANGE UP (ref 70–115)
GLUCOSE UR QL: NEGATIVE MG/DL — SIGNIFICANT CHANGE UP
HCT VFR BLD CALC: 36.3 % — LOW (ref 37–47)
HGB BLD-MCNC: 11.9 G/DL — LOW (ref 12–16)
KETONES UR-MCNC: NEGATIVE — SIGNIFICANT CHANGE UP
LEUKOCYTE ESTERASE UR-ACNC: NEGATIVE — SIGNIFICANT CHANGE UP
LIDOCAIN IGE QN: 46 U/L — SIGNIFICANT CHANGE UP (ref 22–51)
LYMPHOCYTES # BLD AUTO: 1.8 K/UL — SIGNIFICANT CHANGE UP (ref 1–4.8)
LYMPHOCYTES # BLD AUTO: 22.9 % — SIGNIFICANT CHANGE UP (ref 20–55)
MCHC RBC-ENTMCNC: 26.5 PG — LOW (ref 27–31)
MCHC RBC-ENTMCNC: 32.8 G/DL — SIGNIFICANT CHANGE UP (ref 32–36)
MCV RBC AUTO: 80.8 FL — LOW (ref 81–99)
MONOCYTES # BLD AUTO: 0.6 K/UL — SIGNIFICANT CHANGE UP (ref 0–0.8)
MONOCYTES NFR BLD AUTO: 7.8 % — SIGNIFICANT CHANGE UP (ref 3–10)
NEUTROPHILS # BLD AUTO: 5.2 K/UL — SIGNIFICANT CHANGE UP (ref 1.8–8)
NEUTROPHILS NFR BLD AUTO: 66.9 % — SIGNIFICANT CHANGE UP (ref 37–73)
NITRITE UR-MCNC: NEGATIVE — SIGNIFICANT CHANGE UP
PH UR: 6 — SIGNIFICANT CHANGE UP (ref 5–8)
PLATELET # BLD AUTO: 236 K/UL — SIGNIFICANT CHANGE UP (ref 150–400)
POTASSIUM SERPL-MCNC: 4 MMOL/L — SIGNIFICANT CHANGE UP (ref 3.5–5.3)
POTASSIUM SERPL-SCNC: 4 MMOL/L — SIGNIFICANT CHANGE UP (ref 3.5–5.3)
PROT SERPL-MCNC: 7.5 G/DL — SIGNIFICANT CHANGE UP (ref 6.6–8.7)
PROT UR-MCNC: 15 MG/DL
RBC # BLD: 4.49 M/UL — SIGNIFICANT CHANGE UP (ref 4.4–5.2)
RBC # FLD: 14 % — SIGNIFICANT CHANGE UP (ref 11–15.6)
RBC CASTS # UR COMP ASSIST: SIGNIFICANT CHANGE UP /HPF (ref 0–4)
SODIUM SERPL-SCNC: 138 MMOL/L — SIGNIFICANT CHANGE UP (ref 135–145)
SP GR SPEC: 1.01 — SIGNIFICANT CHANGE UP (ref 1.01–1.02)
UROBILINOGEN FLD QL: NEGATIVE MG/DL — SIGNIFICANT CHANGE UP
WBC # BLD: 7.8 K/UL — SIGNIFICANT CHANGE UP (ref 4.8–10.8)
WBC # FLD AUTO: 7.8 K/UL — SIGNIFICANT CHANGE UP (ref 4.8–10.8)
WBC UR QL: NEGATIVE — SIGNIFICANT CHANGE UP

## 2019-05-01 PROCEDURE — 85027 COMPLETE CBC AUTOMATED: CPT

## 2019-05-01 PROCEDURE — 81001 URINALYSIS AUTO W/SCOPE: CPT

## 2019-05-01 PROCEDURE — 99284 EMERGENCY DEPT VISIT MOD MDM: CPT | Mod: 25

## 2019-05-01 PROCEDURE — 76705 ECHO EXAM OF ABDOMEN: CPT

## 2019-05-01 PROCEDURE — 59025 FETAL NON-STRESS TEST: CPT

## 2019-05-01 PROCEDURE — 36415 COLL VENOUS BLD VENIPUNCTURE: CPT

## 2019-05-01 PROCEDURE — 87086 URINE CULTURE/COLONY COUNT: CPT

## 2019-05-01 PROCEDURE — 99284 EMERGENCY DEPT VISIT MOD MDM: CPT

## 2019-05-01 PROCEDURE — G0463: CPT

## 2019-05-01 PROCEDURE — 76815 OB US LIMITED FETUS(S): CPT

## 2019-05-01 PROCEDURE — 83690 ASSAY OF LIPASE: CPT

## 2019-05-01 PROCEDURE — 76815 OB US LIMITED FETUS(S): CPT | Mod: 26

## 2019-05-01 PROCEDURE — 59050 FETAL MONITOR W/REPORT: CPT

## 2019-05-01 PROCEDURE — 76705 ECHO EXAM OF ABDOMEN: CPT | Mod: 26

## 2019-05-01 PROCEDURE — 80053 COMPREHEN METABOLIC PANEL: CPT

## 2019-05-01 NOTE — ED STATDOCS - PHYSICAL EXAMINATION
Gen: No acute distress, non toxic  HEENT: Mucous membranes moist, pink conjunctivae, EOMI  CV: RRR  Resp: CTAB, normal rate and effort  GI: Abdomen gravid, NT, No rebound, no guarding  Neuro: A&O x 3, moving all 4 extremities

## 2019-05-01 NOTE — ED STATDOCS - OBJECTIVE STATEMENT
29 y/o F, A1, approximately 28 weeks pregnant, presents to the ED c/o intermittent abdominal pain, onset 1 week ago.  Pain is intermittent and severe cramping in nature.  Notes episodes feel like severe contractions.  Episodes last approximately 1-3 seconds at sporadic intervals, notes 4 episodes since arrival to the ED.  Pt states that she was seen in L&D 3 days ago for similar sx and states that baby was evaluated and everything came back normal.  However pt notes that she did not have an US performed or a UA tested.  States "I would like to first be evaluated in the ED before going to L&D again"  Associated sx include diarrhea and chills.  Diarrhea has improved since onset and pt is now producing soft stools.  Pt spoke to her OBGYN today and was sent for basic blood work, however states she has not yet received her results.  Denies fever, chest pain, N/V, SOB, cough, back pain, or HA.  Pt states the baby is still moving.  Denies vaginal discharge or bleeding.

## 2019-05-01 NOTE — ED STATDOCS - CLINICAL SUMMARY MEDICAL DECISION MAKING FREE TEXT BOX
1-3 sec episodes of abd pain, does not appear to be contractions, not timed, no other red flags, seen by L&D 3 days ago, will check labs, US, UA, likely to L&D if negative.  Reeval

## 2019-05-01 NOTE — ED ADULT TRIAGE NOTE - CHIEF COMPLAINT QUOTE
(28 weeks pregnant) pt c/o " extreme abd pain when it occurs" was seen in L+d on Sunday for same compliant, checked on baby everything ok, but pt requesting to be seen  for herself in the ED.

## 2019-05-01 NOTE — ED ADULT NURSE NOTE - OBJECTIVE STATEMENT
received pt AOx4 in supertrack, c/o upper abd pain, pt is 28 wks pregnant, had neg work up with L&D to check on baby, still having stomach pain intermittently. pt denies vomiting, PO intolerance, neck pain, recent travel, sick contacts, SHx, headache, blurred vision, sinus congestion, hematuria, chest pain, sob, blurred vision or any other complaints. resp even unlabored, in no distress, MAEx4, neuro intact. will continue to monitor

## 2019-05-01 NOTE — ED STATDOCS - ATTENDING CONTRIBUTION TO CARE
Mitchell: I performed a face to face bedside interview with patient regarding history of present illness, review of symptoms and past medical history. I completed an independent physical exam and ordered tests/medications as needed.  I have discussed patient's plan of care with advanced care provider. The advanced care provider assisted in  executing the discussed plan. I was available for any questions or issues that may have arose during the execution of the plan of care.

## 2019-05-01 NOTE — ED STATDOCS - NS ED ROS FT
+CHILLS, ABDOMINAL PAIN, DIARRHEA  ROS: No fever  No chest painNo SOB/cough/. No N/V,No dysuria/frequency.  No headache. No Dizziness.    No rashes  No numbness/weakness

## 2019-05-02 VITALS — HEART RATE: 81 BPM | TEMPERATURE: 98 F | SYSTOLIC BLOOD PRESSURE: 123 MMHG | DIASTOLIC BLOOD PRESSURE: 72 MMHG

## 2019-05-02 NOTE — OB PROVIDER TRIAGE NOTE - HISTORY OF PRESENT ILLNESS
Pt is a 29yo  at 28w4d preseted to [facility] ED, for recurrent diarrhea and severe sharp epigastric pain that is shooting in nature, comes about 10 times a day and lasts for 2 seconds, keeps the patient from her tracks. Pt was cleared from ED and transferred to LND Triage  Pt has been admitted previously for diarrhea this  and was discharged in stable condition.  Currently Pt is comfortable   Vital Signs Last 24 Hrs  T(C): 36.8 (02 May 2019 00:20), Max: 37.3 (01 May 2019 19:22)  T(F): 98.24 (02 May 2019 00:20), Max: 99.1 (01 May 2019 19:22)  HR: 81 (02 May 2019 00:20) (75 - 82)  BP: 123/72 (02 May 2019 00:20) (116/58 - 128/64)  RR: 18 (01 May 2019 23:36) (18 - 18)  SpO2: 98% (01 May 2019 19:22) (98% - 98%)  TOCO: no contractions  FHT: reactive    A/P Pt did not have any episode of abovementioned pain while in the LND unit for over 1 hour  -Pt is discharged in reassuring condition.  -Pt is advised regarding healthy food routine in patients with h/o cholecystectomy.

## 2019-05-03 LAB
CULTURE RESULTS: NO GROWTH — SIGNIFICANT CHANGE UP
SPECIMEN SOURCE: SIGNIFICANT CHANGE UP

## 2019-07-01 ENCOUNTER — OUTPATIENT (OUTPATIENT)
Dept: OUTPATIENT SERVICES | Facility: HOSPITAL | Age: 29
LOS: 1 days | End: 2019-07-01
Payer: COMMERCIAL

## 2019-07-01 DIAGNOSIS — Z90.49 ACQUIRED ABSENCE OF OTHER SPECIFIED PARTS OF DIGESTIVE TRACT: Chronic | ICD-10-CM

## 2019-07-01 DIAGNOSIS — Z90.89 ACQUIRED ABSENCE OF OTHER ORGANS: Chronic | ICD-10-CM

## 2019-07-01 DIAGNOSIS — K08.409 PARTIAL LOSS OF TEETH, UNSPECIFIED CAUSE, UNSPECIFIED CLASS: Chronic | ICD-10-CM

## 2019-07-01 DIAGNOSIS — Z01.818 ENCOUNTER FOR OTHER PREPROCEDURAL EXAMINATION: ICD-10-CM

## 2019-07-01 LAB
ALBUMIN SERPL ELPH-MCNC: 3.6 G/DL — SIGNIFICANT CHANGE UP (ref 3.3–5.2)
ALP SERPL-CCNC: 113 U/L — SIGNIFICANT CHANGE UP (ref 40–120)
ALT FLD-CCNC: 25 U/L — SIGNIFICANT CHANGE UP
ANION GAP SERPL CALC-SCNC: 11 MMOL/L — SIGNIFICANT CHANGE UP (ref 5–17)
APTT BLD: 24 SEC — LOW (ref 27.5–36.3)
AST SERPL-CCNC: 15 U/L — SIGNIFICANT CHANGE UP
BASOPHILS # BLD AUTO: 0.01 K/UL — SIGNIFICANT CHANGE UP (ref 0–0.2)
BASOPHILS NFR BLD AUTO: 0.1 % — SIGNIFICANT CHANGE UP (ref 0–2)
BILIRUB SERPL-MCNC: 0.2 MG/DL — LOW (ref 0.4–2)
BLD GP AB SCN SERPL QL: SIGNIFICANT CHANGE UP
BUN SERPL-MCNC: 7 MG/DL — LOW (ref 8–20)
CALCIUM SERPL-MCNC: 9.8 MG/DL — SIGNIFICANT CHANGE UP (ref 8.6–10.2)
CHLORIDE SERPL-SCNC: 103 MMOL/L — SIGNIFICANT CHANGE UP (ref 98–107)
CO2 SERPL-SCNC: 22 MMOL/L — SIGNIFICANT CHANGE UP (ref 22–29)
CREAT SERPL-MCNC: 0.43 MG/DL — LOW (ref 0.5–1.3)
EOSINOPHIL # BLD AUTO: 0.09 K/UL — SIGNIFICANT CHANGE UP (ref 0–0.5)
EOSINOPHIL NFR BLD AUTO: 1.3 % — SIGNIFICANT CHANGE UP (ref 0–6)
GLUCOSE SERPL-MCNC: 96 MG/DL — SIGNIFICANT CHANGE UP (ref 70–115)
HCT VFR BLD CALC: 36.7 % — SIGNIFICANT CHANGE UP (ref 34.5–45)
HGB BLD-MCNC: 11.8 G/DL — SIGNIFICANT CHANGE UP (ref 11.5–15.5)
IMM GRANULOCYTES NFR BLD AUTO: 1 % — SIGNIFICANT CHANGE UP (ref 0–1.5)
INR BLD: 0.97 RATIO — SIGNIFICANT CHANGE UP (ref 0.88–1.16)
LYMPHOCYTES # BLD AUTO: 1.35 K/UL — SIGNIFICANT CHANGE UP (ref 1–3.3)
LYMPHOCYTES # BLD AUTO: 18.9 % — SIGNIFICANT CHANGE UP (ref 13–44)
MCHC RBC-ENTMCNC: 26.2 PG — LOW (ref 27–34)
MCHC RBC-ENTMCNC: 32.2 GM/DL — SIGNIFICANT CHANGE UP (ref 32–36)
MCV RBC AUTO: 81.6 FL — SIGNIFICANT CHANGE UP (ref 80–100)
MONOCYTES # BLD AUTO: 0.44 K/UL — SIGNIFICANT CHANGE UP (ref 0–0.9)
MONOCYTES NFR BLD AUTO: 6.2 % — SIGNIFICANT CHANGE UP (ref 2–14)
NEUTROPHILS # BLD AUTO: 5.17 K/UL — SIGNIFICANT CHANGE UP (ref 1.8–7.4)
NEUTROPHILS NFR BLD AUTO: 72.5 % — SIGNIFICANT CHANGE UP (ref 43–77)
PLATELET # BLD AUTO: 223 K/UL — SIGNIFICANT CHANGE UP (ref 150–400)
POTASSIUM SERPL-MCNC: 3.8 MMOL/L — SIGNIFICANT CHANGE UP (ref 3.5–5.3)
POTASSIUM SERPL-SCNC: 3.8 MMOL/L — SIGNIFICANT CHANGE UP (ref 3.5–5.3)
PROT SERPL-MCNC: 7.2 G/DL — SIGNIFICANT CHANGE UP (ref 6.6–8.7)
PROTHROM AB SERPL-ACNC: 11.2 SEC — SIGNIFICANT CHANGE UP (ref 10–12.9)
RBC # BLD: 4.5 M/UL — SIGNIFICANT CHANGE UP (ref 3.8–5.2)
RBC # FLD: 14.3 % — SIGNIFICANT CHANGE UP (ref 10.3–14.5)
SODIUM SERPL-SCNC: 136 MMOL/L — SIGNIFICANT CHANGE UP (ref 135–145)
WBC # BLD: 7.13 K/UL — SIGNIFICANT CHANGE UP (ref 3.8–10.5)
WBC # FLD AUTO: 7.13 K/UL — SIGNIFICANT CHANGE UP (ref 3.8–10.5)

## 2019-07-01 PROCEDURE — 85610 PROTHROMBIN TIME: CPT

## 2019-07-01 PROCEDURE — 86900 BLOOD TYPING SEROLOGIC ABO: CPT

## 2019-07-01 PROCEDURE — 85027 COMPLETE CBC AUTOMATED: CPT

## 2019-07-01 PROCEDURE — 36415 COLL VENOUS BLD VENIPUNCTURE: CPT

## 2019-07-01 PROCEDURE — 80053 COMPREHEN METABOLIC PANEL: CPT

## 2019-07-01 PROCEDURE — 85730 THROMBOPLASTIN TIME PARTIAL: CPT

## 2019-07-01 PROCEDURE — 86901 BLOOD TYPING SEROLOGIC RH(D): CPT

## 2019-07-01 PROCEDURE — 86850 RBC ANTIBODY SCREEN: CPT

## 2019-07-12 NOTE — OB PROVIDER H&P - NSHPPHYSICALEXAM_GEN_ALL_CORE
ICU Vital Signs Last 24 Hrs  T(C): 37 (13 Jul 2019 06:19), Max: 37 (13 Jul 2019 06:19)  T(F): 98.6 (13 Jul 2019 06:19), Max: 98.6 (13 Jul 2019 06:19)  HR: 101 (13 Jul 2019 06:19) (101 - 101)  BP: 113/73 (13 Jul 2019 06:19) (113/73 - 113/73)  BP(mean): --  ABP: --  ABP(mean): --  RR: 18 (13 Jul 2019 06:19) (18 - 18)  SpO2: --    FHT: Baseline 130, minimal to moderate variability. No decels  [place]: CTX q7   Sono: Confirmed vertex, placenta anterior ICU Vital Signs Last 24 Hrs  T(C): 37 (13 Jul 2019 06:19), Max: 37 (13 Jul 2019 06:19)  T(F): 98.6 (13 Jul 2019 06:19), Max: 98.6 (13 Jul 2019 06:19)  HR: 101 (13 Jul 2019 06:19) (101 - 101)  BP: 113/73 (13 Jul 2019 06:19) (113/73 - 113/73)  RR: 18 (13 Jul 2019 06:19) (18 - 18)    FHT: Baseline 130, minimal to moderate variability. No decels  [place]: CTX q7   Sono: Confirmed vertex, placenta anterior

## 2019-07-12 NOTE — OB PROVIDER H&P - HISTORY OF PRESENT ILLNESS
Name Mary Tang						       Attending, time  Harshal,   Patient is a 29 y.o  at 39w consistent with LMP (10/13/18) who presents to L&D for scheduled repeat  section. MACK: 19   Prenatal course uncomplicated.     OBHx:  2017: PROM, c/s for non-reassuring fht, nuchal cord  2018: induced   PMH/PSH: cholecystectomy, tonsillectomy.   ALL: penicillin (hives), Keflex (hives), [telfa drsq?-blisters]  Meds: PNV Patient is a 29 y.o  at 39w consistent with LMP (10/13/18) who presents to L&D for scheduled repeat  section. MACK: 19   Prenatal course otherwise uncomplicated.     OBHx:  G1: 2017: PROM, c/s for non-reassuring fht, nuchal cord  G2: 2018: induced     PMH/PSH: cholecystectomy, tonsillectomy.   ALL: penicillin (hives), Keflex (hives), [telfa drsq?-blisters]  Meds: PNV Patient is a 29 y.o  at 39w consistent with LMP (10/13/18) who presents to L&D for scheduled elective repeat  section. MACK: 19   Prenatal course otherwise uncomplicated.     Patient reports good fetal movement. Irregular CTXs every 7-10 min, no loss of fluid or vaginal bleeding.      OBHx:  G1: 2017: PROM, c/s for non-reassuring fht, nuchal cord  G2: 2018: induced     PMH/PSH: cholecystectomy, tonsillectomy.   ALL: penicillin (hives), Keflex (hives), [telfa drsq?-blisters]  Meds: PNV Patient is a 29 y.o  at 39w consistent with LMP (10/13/18) and first trimester sono who presents to L&D for scheduled elective repeat  section. MACK: 19   Prenatal course otherwise uncomplicated.     Patient reports good fetal movement. Irregular CTXs every 7-10 min, no loss of fluid or vaginal bleeding.      OBHx:  G1: 2017: PROM, c/s for non-reassuring fht, nuchal cord  G2: 2018: induced     PMH/PSH: cholecystectomy, tonsillectomy,  section  ALL: penicillin (hives), Keflex (hives), [telfa drsq?-blisters]  Meds: PNV

## 2019-07-12 NOTE — OB PROVIDER H&P - PSH
History of tonsillectomy    S/P cholecystectomy    [place] teeth extracted History of  delivery  2017  History of tonsillectomy    S/P cholecystectomy    [place] teeth extracted

## 2019-07-12 NOTE — OB PROVIDER H&P - ASSESSMENT
Patient is a 28 yo  at 39 0/7w consistent with LMP (10/13/18) who presents to L&D for scheduled repeat  section. MACK: 19   -Admit to L&D   -Admission labs pending, ppx antibiotics

## 2019-07-13 ENCOUNTER — INPATIENT (INPATIENT)
Facility: HOSPITAL | Age: 29
LOS: 1 days | Discharge: HOME | End: 2019-07-15
Payer: COMMERCIAL

## 2019-07-13 VITALS — TEMPERATURE: 99 F

## 2019-07-13 DIAGNOSIS — Z3A.39 39 WEEKS GESTATION OF PREGNANCY: ICD-10-CM

## 2019-07-13 DIAGNOSIS — Z98.891 HISTORY OF UTERINE SCAR FROM PREVIOUS SURGERY: Chronic | ICD-10-CM

## 2019-07-13 DIAGNOSIS — Z90.49 ACQUIRED ABSENCE OF OTHER SPECIFIED PARTS OF DIGESTIVE TRACT: Chronic | ICD-10-CM

## 2019-07-13 DIAGNOSIS — Z90.89 ACQUIRED ABSENCE OF OTHER ORGANS: Chronic | ICD-10-CM

## 2019-07-13 DIAGNOSIS — K08.409 PARTIAL LOSS OF TEETH, UNSPECIFIED CAUSE, UNSPECIFIED CLASS: Chronic | ICD-10-CM

## 2019-07-13 DIAGNOSIS — O34.219 MATERNAL CARE FOR UNSPECIFIED TYPE SCAR FROM PREVIOUS CESAREAN DELIVERY: ICD-10-CM

## 2019-07-13 LAB
BASOPHILS # BLD AUTO: 0.02 K/UL — SIGNIFICANT CHANGE UP (ref 0–0.2)
BASOPHILS NFR BLD AUTO: 0.3 % — SIGNIFICANT CHANGE UP (ref 0–2)
BLD GP AB SCN SERPL QL: SIGNIFICANT CHANGE UP
EOSINOPHIL # BLD AUTO: 0.1 K/UL — SIGNIFICANT CHANGE UP (ref 0–0.5)
EOSINOPHIL NFR BLD AUTO: 1.3 % — SIGNIFICANT CHANGE UP (ref 0–6)
HCT VFR BLD CALC: 35.6 % — SIGNIFICANT CHANGE UP (ref 34.5–45)
HGB BLD-MCNC: 11.5 G/DL — SIGNIFICANT CHANGE UP (ref 11.5–15.5)
IMM GRANULOCYTES NFR BLD AUTO: 0.8 % — SIGNIFICANT CHANGE UP (ref 0–1.5)
LYMPHOCYTES # BLD AUTO: 1.62 K/UL — SIGNIFICANT CHANGE UP (ref 1–3.3)
LYMPHOCYTES # BLD AUTO: 21.2 % — SIGNIFICANT CHANGE UP (ref 13–44)
MCHC RBC-ENTMCNC: 26.1 PG — LOW (ref 27–34)
MCHC RBC-ENTMCNC: 32.3 GM/DL — SIGNIFICANT CHANGE UP (ref 32–36)
MCV RBC AUTO: 80.9 FL — SIGNIFICANT CHANGE UP (ref 80–100)
MONOCYTES # BLD AUTO: 0.48 K/UL — SIGNIFICANT CHANGE UP (ref 0–0.9)
MONOCYTES NFR BLD AUTO: 6.3 % — SIGNIFICANT CHANGE UP (ref 2–14)
NEUTROPHILS # BLD AUTO: 5.35 K/UL — SIGNIFICANT CHANGE UP (ref 1.8–7.4)
NEUTROPHILS NFR BLD AUTO: 70.1 % — SIGNIFICANT CHANGE UP (ref 43–77)
PLATELET # BLD AUTO: 238 K/UL — SIGNIFICANT CHANGE UP (ref 150–400)
RBC # BLD: 4.4 M/UL — SIGNIFICANT CHANGE UP (ref 3.8–5.2)
RBC # FLD: 14.6 % — HIGH (ref 10.3–14.5)
TYPE + AB SCN PNL BLD: SIGNIFICANT CHANGE UP
WBC # BLD: 7.63 K/UL — SIGNIFICANT CHANGE UP (ref 3.8–10.5)
WBC # FLD AUTO: 7.63 K/UL — SIGNIFICANT CHANGE UP (ref 3.8–10.5)

## 2019-07-13 RX ORDER — SODIUM CHLORIDE 9 MG/ML
1000 INJECTION, SOLUTION INTRAVENOUS
Refills: 0 | Status: DISCONTINUED | OUTPATIENT
Start: 2019-07-13 | End: 2019-07-13

## 2019-07-13 RX ORDER — SODIUM CHLORIDE 9 MG/ML
1000 INJECTION, SOLUTION INTRAVENOUS ONCE
Refills: 0 | Status: COMPLETED | OUTPATIENT
Start: 2019-07-13 | End: 2019-07-13

## 2019-07-13 RX ORDER — OXYTOCIN 10 UNIT/ML
333.33 VIAL (ML) INJECTION
Qty: 20 | Refills: 0 | Status: DISCONTINUED | OUTPATIENT
Start: 2019-07-13 | End: 2019-07-13

## 2019-07-13 RX ORDER — KETOROLAC TROMETHAMINE 30 MG/ML
30 SYRINGE (ML) INJECTION ONCE
Refills: 0 | Status: DISCONTINUED | OUTPATIENT
Start: 2019-07-13 | End: 2019-07-13

## 2019-07-13 RX ORDER — NALBUPHINE HYDROCHLORIDE 10 MG/ML
2.5 INJECTION, SOLUTION INTRAMUSCULAR; INTRAVENOUS; SUBCUTANEOUS EVERY 6 HOURS
Refills: 0 | Status: DISCONTINUED | OUTPATIENT
Start: 2019-07-13 | End: 2019-07-15

## 2019-07-13 RX ORDER — TETANUS TOXOID, REDUCED DIPHTHERIA TOXOID AND ACELLULAR PERTUSSIS VACCINE, ADSORBED 5; 2.5; 8; 8; 2.5 [IU]/.5ML; [IU]/.5ML; UG/.5ML; UG/.5ML; UG/.5ML
0.5 SUSPENSION INTRAMUSCULAR ONCE
Refills: 0 | Status: DISCONTINUED | OUTPATIENT
Start: 2019-07-13 | End: 2019-07-15

## 2019-07-13 RX ORDER — FAMOTIDINE 10 MG/ML
20 INJECTION INTRAVENOUS ONCE
Refills: 0 | Status: COMPLETED | OUTPATIENT
Start: 2019-07-13 | End: 2019-07-13

## 2019-07-13 RX ORDER — IBUPROFEN 200 MG
600 TABLET ORAL EVERY 6 HOURS
Refills: 0 | Status: COMPLETED | OUTPATIENT
Start: 2019-07-13 | End: 2020-06-10

## 2019-07-13 RX ORDER — GLYCERIN ADULT
1 SUPPOSITORY, RECTAL RECTAL AT BEDTIME
Refills: 0 | Status: DISCONTINUED | OUTPATIENT
Start: 2019-07-13 | End: 2019-07-13

## 2019-07-13 RX ORDER — LANOLIN
1 OINTMENT (GRAM) TOPICAL EVERY 6 HOURS
Refills: 0 | Status: DISCONTINUED | OUTPATIENT
Start: 2019-07-13 | End: 2019-07-15

## 2019-07-13 RX ORDER — ONDANSETRON 8 MG/1
4 TABLET, FILM COATED ORAL ONCE
Refills: 0 | Status: DISCONTINUED | OUTPATIENT
Start: 2019-07-13 | End: 2019-07-13

## 2019-07-13 RX ORDER — DIPHENHYDRAMINE HCL 50 MG
25 CAPSULE ORAL EVERY 4 HOURS
Refills: 0 | Status: DISCONTINUED | OUTPATIENT
Start: 2019-07-13 | End: 2019-07-15

## 2019-07-13 RX ORDER — ACETAMINOPHEN 500 MG
1000 TABLET ORAL ONCE
Refills: 0 | Status: DISCONTINUED | OUTPATIENT
Start: 2019-07-13 | End: 2019-07-15

## 2019-07-13 RX ORDER — SODIUM CHLORIDE 9 MG/ML
1000 INJECTION, SOLUTION INTRAVENOUS
Refills: 0 | Status: DISCONTINUED | OUTPATIENT
Start: 2019-07-13 | End: 2019-07-15

## 2019-07-13 RX ORDER — DIPHENHYDRAMINE HCL 50 MG
25 CAPSULE ORAL ONCE
Refills: 0 | Status: DISCONTINUED | OUTPATIENT
Start: 2019-07-13 | End: 2019-07-15

## 2019-07-13 RX ORDER — GENTAMICIN SULFATE 40 MG/ML
285 VIAL (ML) INJECTION ONCE
Refills: 0 | Status: COMPLETED | OUTPATIENT
Start: 2019-07-13 | End: 2019-07-13

## 2019-07-13 RX ORDER — GENTAMICIN SULFATE 40 MG/ML
400 VIAL (ML) INJECTION ONCE
Refills: 0 | Status: DISCONTINUED | OUTPATIENT
Start: 2019-07-13 | End: 2019-07-13

## 2019-07-13 RX ORDER — KETOROLAC TROMETHAMINE 30 MG/ML
30 SYRINGE (ML) INJECTION EVERY 6 HOURS
Refills: 0 | Status: DISCONTINUED | OUTPATIENT
Start: 2019-07-13 | End: 2019-07-14

## 2019-07-13 RX ORDER — ACETAMINOPHEN 500 MG
975 TABLET ORAL
Refills: 0 | Status: DISCONTINUED | OUTPATIENT
Start: 2019-07-13 | End: 2019-07-15

## 2019-07-13 RX ORDER — DIPHENHYDRAMINE HCL 50 MG
25 CAPSULE ORAL EVERY 6 HOURS
Refills: 0 | Status: DISCONTINUED | OUTPATIENT
Start: 2019-07-13 | End: 2019-07-15

## 2019-07-13 RX ORDER — METOCLOPRAMIDE HCL 10 MG
10 TABLET ORAL ONCE
Refills: 0 | Status: COMPLETED | OUTPATIENT
Start: 2019-07-13 | End: 2019-07-13

## 2019-07-13 RX ORDER — OXYCODONE HYDROCHLORIDE 5 MG/1
5 TABLET ORAL ONCE
Refills: 0 | Status: DISCONTINUED | OUTPATIENT
Start: 2019-07-13 | End: 2019-07-15

## 2019-07-13 RX ORDER — NALOXONE HYDROCHLORIDE 4 MG/.1ML
0.1 SPRAY NASAL
Refills: 0 | Status: DISCONTINUED | OUTPATIENT
Start: 2019-07-13 | End: 2019-07-15

## 2019-07-13 RX ORDER — CITRIC ACID/SODIUM CITRATE 300-500 MG
30 SOLUTION, ORAL ORAL ONCE
Refills: 0 | Status: COMPLETED | OUTPATIENT
Start: 2019-07-13 | End: 2019-07-13

## 2019-07-13 RX ORDER — OXYCODONE HYDROCHLORIDE 5 MG/1
5 TABLET ORAL
Refills: 0 | Status: COMPLETED | OUTPATIENT
Start: 2019-07-13 | End: 2019-07-20

## 2019-07-13 RX ORDER — ONDANSETRON 8 MG/1
4 TABLET, FILM COATED ORAL EVERY 6 HOURS
Refills: 0 | Status: DISCONTINUED | OUTPATIENT
Start: 2019-07-13 | End: 2019-07-15

## 2019-07-13 RX ORDER — OXYTOCIN 10 UNIT/ML
333.33 VIAL (ML) INJECTION
Qty: 20 | Refills: 0 | Status: DISCONTINUED | OUTPATIENT
Start: 2019-07-13 | End: 2019-07-15

## 2019-07-13 RX ORDER — KETOROLAC TROMETHAMINE 30 MG/ML
30 SYRINGE (ML) INJECTION EVERY 6 HOURS
Refills: 0 | Status: DISCONTINUED | OUTPATIENT
Start: 2019-07-13 | End: 2019-07-15

## 2019-07-13 RX ORDER — DOCUSATE SODIUM 100 MG
100 CAPSULE ORAL
Refills: 0 | Status: DISCONTINUED | OUTPATIENT
Start: 2019-07-13 | End: 2019-07-15

## 2019-07-13 RX ORDER — MAGNESIUM HYDROXIDE 400 MG/1
30 TABLET, CHEWABLE ORAL
Refills: 0 | Status: DISCONTINUED | OUTPATIENT
Start: 2019-07-13 | End: 2019-07-15

## 2019-07-13 RX ORDER — SIMETHICONE 80 MG/1
80 TABLET, CHEWABLE ORAL EVERY 4 HOURS
Refills: 0 | Status: DISCONTINUED | OUTPATIENT
Start: 2019-07-13 | End: 2019-07-15

## 2019-07-13 RX ADMIN — Medication 250 MILLIGRAM(S): at 08:04

## 2019-07-13 RX ADMIN — Medication 1000 MILLIUNIT(S)/MIN: at 08:30

## 2019-07-13 RX ADMIN — Medication 100 MILLIGRAM(S): at 23:25

## 2019-07-13 RX ADMIN — Medication 1000 MILLIUNIT(S)/MIN: at 09:57

## 2019-07-13 RX ADMIN — Medication 100 MILLIGRAM(S): at 16:07

## 2019-07-13 RX ADMIN — Medication 975 MILLIGRAM(S): at 17:00

## 2019-07-13 RX ADMIN — Medication 30 MILLIGRAM(S): at 10:15

## 2019-07-13 RX ADMIN — Medication 10 MILLIGRAM(S): at 07:27

## 2019-07-13 RX ADMIN — SODIUM CHLORIDE 125 MILLILITER(S): 9 INJECTION, SOLUTION INTRAVENOUS at 07:11

## 2019-07-13 RX ADMIN — Medication 100 MILLIGRAM(S): at 07:39

## 2019-07-13 RX ADMIN — FAMOTIDINE 20 MILLIGRAM(S): 10 INJECTION INTRAVENOUS at 07:27

## 2019-07-13 RX ADMIN — Medication 30 MILLIGRAM(S): at 23:25

## 2019-07-13 RX ADMIN — Medication 975 MILLIGRAM(S): at 16:07

## 2019-07-13 RX ADMIN — SODIUM CHLORIDE 2000 MILLILITER(S): 9 INJECTION, SOLUTION INTRAVENOUS at 06:30

## 2019-07-13 RX ADMIN — Medication 30 MILLIGRAM(S): at 10:02

## 2019-07-13 RX ADMIN — Medication 30 MILLILITER(S): at 07:00

## 2019-07-13 NOTE — OB RN PATIENT PROFILE - PSH
History of  delivery  2017  History of tonsillectomy    S/P cholecystectomy    [place] teeth extracted

## 2019-07-14 LAB
BASOPHILS # BLD AUTO: 0.02 K/UL — SIGNIFICANT CHANGE UP (ref 0–0.2)
BASOPHILS NFR BLD AUTO: 0.2 % — SIGNIFICANT CHANGE UP (ref 0–2)
EOSINOPHIL # BLD AUTO: 0.12 K/UL — SIGNIFICANT CHANGE UP (ref 0–0.5)
EOSINOPHIL NFR BLD AUTO: 1 % — SIGNIFICANT CHANGE UP (ref 0–6)
HCT VFR BLD CALC: 31.1 % — LOW (ref 34.5–45)
HGB BLD-MCNC: 9.7 G/DL — LOW (ref 11.5–15.5)
IMM GRANULOCYTES NFR BLD AUTO: 0.7 % — SIGNIFICANT CHANGE UP (ref 0–1.5)
LYMPHOCYTES # BLD AUTO: 17.7 % — SIGNIFICANT CHANGE UP (ref 13–44)
LYMPHOCYTES # BLD AUTO: 2.12 K/UL — SIGNIFICANT CHANGE UP (ref 1–3.3)
MCHC RBC-ENTMCNC: 25.9 PG — LOW (ref 27–34)
MCHC RBC-ENTMCNC: 31.2 GM/DL — LOW (ref 32–36)
MCV RBC AUTO: 83.2 FL — SIGNIFICANT CHANGE UP (ref 80–100)
MEV IGG SER-ACNC: 159 AU/ML — SIGNIFICANT CHANGE UP
MEV IGG+IGM SER-IMP: POSITIVE — SIGNIFICANT CHANGE UP
MONOCYTES # BLD AUTO: 0.68 K/UL — SIGNIFICANT CHANGE UP (ref 0–0.9)
MONOCYTES NFR BLD AUTO: 5.7 % — SIGNIFICANT CHANGE UP (ref 2–14)
NEUTROPHILS # BLD AUTO: 8.93 K/UL — HIGH (ref 1.8–7.4)
NEUTROPHILS NFR BLD AUTO: 74.7 % — SIGNIFICANT CHANGE UP (ref 43–77)
PLATELET # BLD AUTO: 207 K/UL — SIGNIFICANT CHANGE UP (ref 150–400)
RBC # BLD: 3.74 M/UL — LOW (ref 3.8–5.2)
RBC # FLD: 14.9 % — HIGH (ref 10.3–14.5)
T PALLIDUM AB TITR SER: NEGATIVE — SIGNIFICANT CHANGE UP
WBC # BLD: 11.95 K/UL — HIGH (ref 3.8–10.5)
WBC # FLD AUTO: 11.95 K/UL — HIGH (ref 3.8–10.5)

## 2019-07-14 RX ORDER — IBUPROFEN 200 MG
600 TABLET ORAL EVERY 6 HOURS
Refills: 0 | Status: DISCONTINUED | OUTPATIENT
Start: 2019-07-14 | End: 2019-07-15

## 2019-07-14 RX ORDER — OXYCODONE HYDROCHLORIDE 5 MG/1
5 TABLET ORAL ONCE
Refills: 0 | Status: DISCONTINUED | OUTPATIENT
Start: 2019-07-14 | End: 2019-07-15

## 2019-07-14 RX ORDER — OXYCODONE HYDROCHLORIDE 5 MG/1
5 TABLET ORAL
Refills: 0 | Status: DISCONTINUED | OUTPATIENT
Start: 2019-07-14 | End: 2019-07-15

## 2019-07-14 RX ADMIN — Medication 600 MILLIGRAM(S): at 18:25

## 2019-07-14 RX ADMIN — Medication 600 MILLIGRAM(S): at 19:27

## 2019-07-14 RX ADMIN — Medication 30 MILLIGRAM(S): at 06:40

## 2019-07-14 RX ADMIN — SIMETHICONE 80 MILLIGRAM(S): 80 TABLET, CHEWABLE ORAL at 12:16

## 2019-07-14 RX ADMIN — Medication 600 MILLIGRAM(S): at 13:00

## 2019-07-14 RX ADMIN — Medication 600 MILLIGRAM(S): at 23:47

## 2019-07-14 RX ADMIN — SIMETHICONE 80 MILLIGRAM(S): 80 TABLET, CHEWABLE ORAL at 18:25

## 2019-07-14 RX ADMIN — Medication 100 MILLIGRAM(S): at 12:17

## 2019-07-14 RX ADMIN — Medication 30 MILLIGRAM(S): at 06:11

## 2019-07-14 RX ADMIN — Medication 600 MILLIGRAM(S): at 12:16

## 2019-07-14 NOTE — PROGRESS NOTE ADULT - ASSESSMENT
Marta BRIAN is a 29  @39wks POD#1 s/p  section 2/2 to failed IOL for cHTN    -Recovering well   -Voiding, tolerating PO, -flatus/BM  -Advance care as tolerated   -Continue routine postpartum/postoperative care and education.

## 2019-07-14 NOTE — PROGRESS NOTE ADULT - SUBJECTIVE AND OBJECTIVE BOX
AN TORRES is a 29  @39wks POD#1 s/p  section 2/2 to failed IOL for cHTN    S:    She is doing well, has no complaints.   Pain controlled with medication   She is ambulating without difficulty. tolerating PO, and breastfeeding without difficulty  - flatus/-BM  Denies any n/v, fever, chills, headache, SOB, CP or dyuria     O:    T(C): 36.8 (19 @ 04:00), Max: 37 (19 @ 21:03)  HR: 68 (19 @ 04:00) (66 - 84)  BP: 112/68 (19 @ 04:00) (92/52 - 123/63)  RR: 18 (19 @ 04:00) (14 - 21)  SpO2: 97% (19 @ 04:00) (95% - 98%)  Wt(kg): --    Physical exam:  Abdomen:  soft, non-tender, non-distended, +bowel sounds.  Uterus:  Fundus firm below umbilicus  Incision: Pressure dressing in place, Clean dry and intact  VE:  +lochia  Ext:  Negative calf tenderness b/l. negative shabnam's sign b/l                          11.5   7.63  )-----------( 238      ( 2019 06:51 )             35.6

## 2019-07-15 ENCOUNTER — TRANSCRIPTION ENCOUNTER (OUTPATIENT)
Age: 29
End: 2019-07-15

## 2019-07-15 VITALS
RESPIRATION RATE: 18 BRPM | HEART RATE: 89 BPM | DIASTOLIC BLOOD PRESSURE: 72 MMHG | SYSTOLIC BLOOD PRESSURE: 119 MMHG | TEMPERATURE: 98 F

## 2019-07-15 PROCEDURE — 85027 COMPLETE CBC AUTOMATED: CPT

## 2019-07-15 PROCEDURE — 86901 BLOOD TYPING SEROLOGIC RH(D): CPT

## 2019-07-15 PROCEDURE — 59050 FETAL MONITOR W/REPORT: CPT

## 2019-07-15 PROCEDURE — 86780 TREPONEMA PALLIDUM: CPT

## 2019-07-15 PROCEDURE — 36415 COLL VENOUS BLD VENIPUNCTURE: CPT

## 2019-07-15 PROCEDURE — 86850 RBC ANTIBODY SCREEN: CPT

## 2019-07-15 PROCEDURE — 76815 OB US LIMITED FETUS(S): CPT

## 2019-07-15 PROCEDURE — G0463: CPT

## 2019-07-15 PROCEDURE — 86900 BLOOD TYPING SEROLOGIC ABO: CPT

## 2019-07-15 PROCEDURE — 86765 RUBEOLA ANTIBODY: CPT

## 2019-07-15 RX ORDER — IBUPROFEN 200 MG
1 TABLET ORAL
Qty: 24 | Refills: 0
Start: 2019-07-15

## 2019-07-15 RX ADMIN — Medication 975 MILLIGRAM(S): at 10:54

## 2019-07-15 RX ADMIN — Medication 975 MILLIGRAM(S): at 09:53

## 2019-07-15 RX ADMIN — Medication 975 MILLIGRAM(S): at 05:07

## 2019-07-15 RX ADMIN — Medication 600 MILLIGRAM(S): at 11:45

## 2019-07-15 RX ADMIN — Medication 600 MILLIGRAM(S): at 05:59

## 2019-07-15 RX ADMIN — Medication 600 MILLIGRAM(S): at 05:02

## 2019-07-15 RX ADMIN — Medication 975 MILLIGRAM(S): at 15:14

## 2019-07-15 RX ADMIN — Medication 975 MILLIGRAM(S): at 05:59

## 2019-07-15 RX ADMIN — Medication 975 MILLIGRAM(S): at 15:36

## 2019-07-15 RX ADMIN — Medication 600 MILLIGRAM(S): at 12:30

## 2019-07-15 NOTE — DISCHARGE NOTE OB - MEDICATION SUMMARY - MEDICATIONS TO TAKE
I will START or STAY ON the medications listed below when I get home from the hospital:    ibuprofen 600 mg oral tablet  -- 1 tab(s) by mouth every 6 hours, As Needed -for severe pain MDD:4 tabs   -- Do not take this drug if you are pregnant.  It is very important that you take or use this exactly as directed.  Do not skip doses or discontinue unless directed by your doctor.  May cause drowsiness or dizziness.  Obtain medical advice before taking any non-prescription drugs as some may affect the action of this medication.  Take with food or milk.    -- Indication: For prn mod pain

## 2019-07-15 NOTE — OB PROVIDER DELIVERY SUMMARY - NSPROVIDERDELIVERYNOTE_OBGYN_ALL_OB_FT
Pt taken to the OR for rpt C/S at 39 wks not in labor.  Rpt C/S performed by Dr MANA Montalvo, assist Dr DULCE MARIA Montes, second assist Dr SHAMIR Figueroa PGY-1 under spinal anesthesia.  Delivered live female infant, VTX, through clear amniotic fluid at 8:02am.  No nuchal cord.  [place] cleared by neonatologist and skin-to-skin initiated in OR.  Cord blood collection for banking performed ([employer]).  Placenta WNL, 3 vessel cord, no gross pathology.  Uterus, tubes, ovaries WNL.  EBL 700cc.  No complications.  [place] kept with pt to RR, then later admitted to [facility].  Mother stable.

## 2019-07-15 NOTE — PROGRESS NOTE ADULT - SUBJECTIVE AND OBJECTIVE BOX
Patient is a 30yo  now POD#2 s/p  section 2/2 to failed IOL for cHTN    S:    The patient has complains of moderately well controlled pain. Pain controlled more with combination of motrin and tylenol than motrin alone  She is ambulating without difficulty and tolerating PO.   + flatus/-BM   She has questions about keeping incision/staples dry or having good air movement due to her abdomen overhanging incision    O:    T(C): 36.6 (19 @ 20:17), Max: 36.9 (19 @ 08:25)  HR: 80 (19 @ 20:17) (75 - 80)  BP: 113/67 (19 @ 20:17) (106/68 - 113/67)  RR: 18 (19 @ 20:17) (18 - 18)    Breast: nontender, not engorged   Abdomen:  soft, non-tender, non-distended, +bowel sounds.  Uterus:  Fundus firm below umbilicus  Incision:  Clean/dry/intact  with staples in place  VE:  +lochia  Ext:  Non-tender, no edema                           9.7    11.95 )-----------( 207      ( 2019 08:05 )             31.1

## 2019-07-15 NOTE — DISCHARGE NOTE OB - PATIENT PORTAL LINK FT
You can access the [facility] Patient Portal, offered by [facility], by registering with the following website: [email or web address]

## 2019-07-15 NOTE — DISCHARGE NOTE OB - MEDICATION SUMMARY - MEDICATIONS TO CHANGE
Per staff mess contacted pt at [phone] lft mess on ans mach for [place] interpreters [phone] Access Code No. [id number] spoke to Brenda ID. [id number]   I will SWITCH the dose or number of times a day I take the medications listed below when I get home from the hospital:  None

## 2019-07-15 NOTE — DISCHARGE NOTE OB - MEDICATION SUMMARY - MEDICATIONS TO STOP TAKING
I will STOP taking the medications listed below when I get home from the hospital:    oxyCODONE-acetaminophen 5 mg-325 mg oral tablet  -- 1 tab(s) by mouth every 6 hours MDD:4  -- Caution federal law prohibits the transfer of this drug to any person other  than the person for whom it was prescribed.  May cause drowsiness.  Alcohol may intensify this effect.  Use care when operating dangerous machinery.  This prescription cannot be refilled.  This product contains acetaminophen.  Do not use  with any other product containing acetaminophen to prevent possible liver damage.  Using more of this medication than prescribed may cause serious breathing problems.    clindamycin 300 mg oral capsule  -- 1 cap(s) by mouth every 6 hours   -- Finish all this medication unless otherwise directed by prescriber.  Medication should be taken with plenty of water.

## 2019-07-15 NOTE — DISCHARGE NOTE OB - CARE PROVIDER_API CALL
Yesy Montalvo)  Obstetrics and Gynecology  [address] [place] [address]  Phone: [phone]  Fax: [phone]  Follow Up Time:

## 2019-07-15 NOTE — PROGRESS NOTE ADULT - ATTENDING COMMENTS
As above, pt doing well, but was a scheduled rpt C/S, did not undergo induction of labor.  On exam, VSS, afebrile.    Breasts soft, NT, NE, +breast feeding  Abd soft, NT, incision dressing clean and dry.  Ext  No DVT tenderness.  POD#1 S/P rpt C/S stable.  Pt recovering well.  Plan for possible early D/C home tomorrow am.
Pt was A SCHEDULED REPEAT C/S AT 39 WKS, NOT IN LABOR.  She is doing well post op and eager for early D/C home.  VSS and PE normal.  OK for early D/C home.  F/U in office later this week for staple removal.

## 2019-07-15 NOTE — PROGRESS NOTE ADULT - ASSESSMENT
A/P:  Patient is a 28yo  now POD#2 s/p  section 2/2 to failed IOL for cHTN    -Stable, BPs 100s-110s/60s  -Voiding, tolerating PO, bowel function nml   -Advance care as tolerated   -Continue routine postpartum/postoperative care and education.  -Healthy female infant

## 2019-07-15 NOTE — DISCHARGE NOTE OB - CARE PLAN
Principal Discharge DX:	 delivery delivered  Goal:	full recovery  Assessment and plan of treatment:	POD#2 S/P rpt C/S stable.  OK for early D/C home.

## 2019-09-03 ENCOUNTER — RESULT REVIEW (OUTPATIENT)
Age: 29
End: 2019-09-03

## 2019-09-11 NOTE — ED ADULT NURSE NOTE - GASTROINTESTINAL WDL
Problem list     Subjective   Maycol Mendoza is a 65 y.o. male     Chief Complaint   Patient presents with   • Follow-up   • Shortness of Breath       HPI    Problem List:  1. Coronary artery disease  1.1 Stenting to the LAD  1.2 Follow-up catheterization, November 2008 indicated patent stent to the LAD with minor nonobstructive CAD otherwise. Medical management was recommended.  1.3 Stress May 2015 demonstrates no evidence of ischemia  1.4 follow-up stress test August 2019 demonstrates lateral wall ischemia preserved LV function  2. Preserved systolic function  3. Hypertension  4. History of DVT. The patient is on chronic Coumadin therapy.  5. Dyslipidemia    Patient is a 65-year-old male who presents back to the office for follow-up.  Patient has been doing well.  He still has occasional neck discomfort.  It appears atypical.  He does not describe but occasional substernal discomfort.  It is very mild.  He does describe occasional neck pain.  This is not associated with chest discomfort.  This occurs at random.  Patient describes that with previous stenting he had significant chest discomfort.  His discomfort at this point is very minimal.  He has shortness of breath that is mild with exertion.  No progressive dyspnea.  No PND orthopnea.    No palpitations or dysrhythmic symptoms and otherwise is doing well      Outpatient Encounter Medications as of 9/11/2019   Medication Sig Dispense Refill   • acetaminophen-codeine (TYLENOL #3) 300-30 MG per tablet Take  by mouth as needed.     • aspirin 81 MG tablet Take 1 tablet by mouth Daily. 90 tablet 3   • cetirizine (zyrTEC) 10 MG tablet Daily.  2   • montelukast (SINGULAIR) 10 MG tablet Daily.  2   • nitroglycerin (NITROSTAT) 0.4 MG SL tablet Place 1 tablet under the tongue Every 5 (Five) Minutes As Needed for Chest Pain. 25 tablet 3   • omeprazole (PriLOSEC) 20 MG capsule Take 1 capsule by mouth daily. 30 capsule 6   • simvastatin (ZOCOR) 40 MG tablet Take 1 tablet  by mouth Daily. 90 tablet 3   • warfarin (COUMADIN) 5 MG tablet Take 1 tablet by mouth Daily. 90 tablet 3     No facility-administered encounter medications on file as of 9/11/2019.        Patient has no known allergies.    Past Medical History:   Diagnosis Date   • Chest pain    • Claudication (CMS/[facility])    • Coronary artery disease    • DVT (deep venous thrombosis) (CMS/[facility])     H.O THE PATIENT IS ON CHRONIC COUMADIN THERAPY    • Hyperlipidemia    • Hypertension    • Lower leg pain    • SOB (shortness of breath)    • Stroke syndrome        Social History     Socioeconomic History   • Marital status:      Spouse name: Not on file   • Number of children: Not on file   • Years of education: Not on file   • Highest education level: Not on file   Tobacco Use   • Smoking status: Current Every Day Smoker     Packs/day: 1.00     Types: Cigarettes   • Smokeless tobacco: Never Used   Substance and Sexual Activity   • Alcohol use: No   • Drug use: No       Family History   Problem Relation Age of Onset   • Cancer Mother    • Heart disease Father    • Heart failure Father        Review of Systems   Constitutional: Negative for fatigue.   HENT: Positive for congestion (allergies ). Negative for rhinorrhea and sore throat.    Eyes: Positive for visual disturbance (glasses PRN ).   Respiratory: Positive for shortness of breath. Negative for chest tightness.    Cardiovascular: Positive for chest pain. Negative for palpitations and leg swelling.   Gastrointestinal: Negative.  Negative for abdominal pain, blood in stool, constipation, diarrhea, nausea and vomiting.   Endocrine: Negative for cold intolerance and heat intolerance.        Pt c/o hot flashes    Genitourinary: Negative.  Negative for difficulty urinating, dysuria, frequency, hematuria and urgency.   Musculoskeletal: Positive for arthralgias and neck pain. Negative for back pain.   Skin: Negative for rash and wound.   Allergic/Immunologic: Positive for  "environmental allergies (seasonal ). Negative for food allergies.   Neurological: Positive for headaches. Negative for dizziness, syncope, weakness, light-headedness and numbness.   Hematological: Bruises/bleeds easily (brusies- on coumadin ).   Psychiatric/Behavioral: Negative for sleep disturbance.   All other systems reviewed and are negative.      Objective   Vitals:    09/11/19 1251   BP: 117/70   Pulse: 86   SpO2: 97%   Weight: 90 kg (198 lb 6.4 oz)   Height: 175.3 cm (69\")      /70   Pulse 86   Ht 175.3 cm (69\")   Wt 90 kg (198 lb 6.4 oz)   SpO2 97%   BMI 29.30 kg/m²     Lab Results (most recent)     None          Physical Exam   Constitutional: He is oriented to person, place, and time. He appears well-developed and well-nourished. No distress.   HENT:   Head: Normocephalic and atraumatic.   Eyes: EOM are normal. Pupils are equal, round, and reactive to light.   Neck: No JVD present.   Cardiovascular: Normal rate, regular rhythm, normal heart sounds and intact distal pulses. Exam reveals no gallop and no friction rub.   No murmur heard.  Pulmonary/Chest: Effort normal and breath sounds normal. No respiratory distress. He has no wheezes. He has no rales. He exhibits no tenderness.   Musculoskeletal: Normal range of motion. He exhibits no edema.   Neurological: He is alert and oriented to person, place, and time. No cranial nerve deficit.   Skin: Skin is warm and dry. No rash noted. No erythema. No pallor.   Psychiatric: He has a normal mood and affect. His behavior is normal.   Nursing note and vitals reviewed.      Procedure   Procedures       Assessment/Plan     Problems Addressed this Visit        Cardiovascular and Mediastinum    Coronary artery disease involving native coronary artery of native heart without angina pectoris    Essential hypertension       Respiratory    Shortness of breath - Primary            Recommendation  1.  Patient with coronary artery disease.  He has atypical pain and " rare chest discomfort at all.  Dyspnea is moderate at baseline and he has abnormal stress test suggesting possible lateral wall ischemia.  Patient would rather monitor symptoms for now.  He does not feel that his symptoms have worsened.  If they were to worsen, he will call our office consider catheterization.  2.  Otherwise he is doing well on current medical therapy and would like to continue as he is.  We will see him back for follow-up in 1 to 2 months.  Will follow with primary as scheduled         Maycol Prietoth is a current cigarettes user.  He currently smokes 1 pack of cigarettes per day for a duration of 55 years. I have educated him on the risk of diseases from using tobacco products such as cancer, COPD and heart diease.     I advised him to quit and he is not willing to quit.         Patient's Body mass index is 29.3 kg/m². BMI is above normal parameters. Recommendations include: educational material.       Electronically signed by:     Abdomen soft, nontender, nondistended, bowel sounds present in all 4 quadrants.

## 2020-02-05 ENCOUNTER — TRANSCRIPTION ENCOUNTER (OUTPATIENT)
Age: 30
End: 2020-02-05

## 2020-02-13 ENCOUNTER — TRANSCRIPTION ENCOUNTER (OUTPATIENT)
Age: 30
End: 2020-02-13

## 2020-09-22 ENCOUNTER — RESULT REVIEW (OUTPATIENT)
Age: 30
End: 2020-09-22

## 2021-03-18 NOTE — OB RN INTRAOPERATIVE NOTE - NS_INTERMITTENTPNEUMATICCOMPRESSIONSTART_OBGYN_ALL_OB_DT
Your pain may be due to viral gastroenteritis or swelling of lymph nodes.  Ultrasound of the ovaries as well as appendix were unremarkable.    If patient develops persistent right lower quadrant abdominal pain, persistent vomiting, decreased oral intake, persistent fevers, return to the emergency department immediately.  
13-Jul-2019 07:40

## 2021-07-19 NOTE — ED STATDOCS - CHIEF COMPLAINT
Show Aperture Variable?: Yes Application Tool (Optional): Liquid Nitrogen Sprayer Aperture Size (Optional): B Duration Of Freeze Thaw-Cycle (Seconds): 5 Render Post-Care Instructions In Note?: no Consent: The patient's consent was obtained including but not limited to risks of crusting, scabbing, blistering, scarring, darker or lighter pigmentary change, recurrence, incomplete removal and infection. Detail Level: Detailed Post-Care Instructions: I reviewed with the patient in detail post-care instructions. Patient is to wear sunprotection, and avoid picking at any of the treated lesions. Pt may apply Vaseline to crusted or scabbing areas. Number Of Freeze-Thaw Cycles: 1 freeze-thaw cycle The patient is a 27y year old Female complaining of allergic reaction.

## 2021-08-31 NOTE — ED ADULT NURSE NOTE - NS ED NURSE LEVEL OF CONSCIOUSNESS MENTAL STATUS
Alert/Awake [Well Nourished] : well nourished [Well Developed] : well developed [Well-Appearing] : well-appearing [Normal Sclera/Conjunctiva] : normal sclera/conjunctiva [PERRL] : pupils equal round and reactive to light [EOMI] : extraocular movements intact [Normal Outer Ear/Nose] : the outer ears and nose were normal in appearance [Normal TMs] : both tympanic membranes were normal [No JVD] : no jugular venous distention [No Lymphadenopathy] : no lymphadenopathy [Supple] : supple [Thyroid Normal, No Nodules] : the thyroid was normal and there were no nodules present [No Respiratory Distress] : no respiratory distress  [No Accessory Muscle Use] : no accessory muscle use [Clear to Auscultation] : lungs were clear to auscultation bilaterally [Regular Rhythm] : with a regular rhythm [Normal Rate] : normal rate  [Normal S1, S2] : normal S1 and S2 [No Murmur] : no murmur heard [No Carotid Bruits] : no carotid bruits [No Varicosities] : no varicosities [Pedal Pulses Present] : the pedal pulses are present [No Edema] : there was no peripheral edema [No Extremity Clubbing/Cyanosis] : no extremity clubbing/cyanosis [Normal Appearance] : normal in appearance [No Nipple Discharge] : no nipple discharge [No Axillary Lymphadenopathy] : no axillary lymphadenopathy [Soft] : abdomen soft [Non Tender] : non-tender [Non-distended] : non-distended [No Masses] : no abdominal mass palpated [No HSM] : no HSM [Normal Bowel Sounds] : normal bowel sounds [Normal Supraclavicular Nodes] : no supraclavicular lymphadenopathy [Normal Axillary Nodes] : no axillary lymphadenopathy [Normal Posterior Cervical Nodes] : no posterior cervical lymphadenopathy [Normal Anterior Cervical Nodes] : no anterior cervical lymphadenopathy [No CVA Tenderness] : no CVA  tenderness [No Spinal Tenderness] : no spinal tenderness [No Joint Swelling] : no joint swelling [Grossly Normal Strength/Tone] : grossly normal strength/tone [No Rash] : no rash [Coordination Grossly Intact] : coordination grossly intact [No Focal Deficits] : no focal deficits [Normal Gait] : normal gait [Normal Affect] : the affect was normal [Alert and Oriented x3] : oriented to person, place, and time [Normal Insight/Judgement] : insight and judgment were intact

## 2021-09-11 NOTE — ED ADULT NURSE NOTE - CHPI ED NUR SYMPTOMS NEG
64-year-old woman with PMH significant for HTN, HLD, L hydroureteronephrosis s/p renal stent on 7/15, who presents with volume overload 2/2 high grade B-cell lymphoma. S/p thoracentesis 8/13, paracentesis and excisional biopsy of left inguinal lymph node on 8/20. Accepted to MICU for acute hypoxic/hypercapneic respiratory failure now s/p intubation and L pleurex catheter placement, now undergoing chemotherapy.    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  - CT head with no intracranial pathology  - S/p LP with flow cytometry and cytopathology to evaluate possible lymphomatous meningitis, negative thus far   - Currently sedated with precedex but following commands - will wean as tolerated    #Cardiovascular  vasoplegic shock 2/2 sedatives   - Echo 8/3 showing concentric left ventricular remodeling, hyperdynamic left ventricle, calcified trileaflet aortic valve with normal opening, EF 56%  - POCUS showing adequate cardiac output  - On pressor support with levo, wean as tolerated    #Respiratory  - Hypoxic/hypercapneic respiratory failure likely secondary to malignant pleural effusions s/p thoracentesis on 8/13 with re-accumulation of pleural effusions  - S/p second thoracentesis 8/25, s/p L pleurex 8/27  - On pressure support, however remains tachypneic with low TVs, will repeat thoracentesis on R side before attempting extubation   - unsuccessful breathing trials, will require future trach once platelets stabilize    #GI/Nutrition  #malignant ascites   - S/p paracentesis 8/20, still remains distended with ascites     #?ileus   - hold tube feeds as pt with residuals and persistent nausea 2/2 chemotherapy   - abd x-ray ordered  - zofran prn  - given reglan 5 today     #diarrhea   - previously constipated s/p golytely + fecal disempaction    #/Renal  - PURVI with worsening CR 2/2 ATN in the setting of supratherapeutic vancomycin level + tumor lysis   - B/L hydronephrosis stable on CT a/p 2/2 malignant LAD  - Nephrology on board, recommend holding LASIX & other nephrotoxic medications.   - Nephrostomy tubes considered however per IR recommendations not appropriate at this time as patient's Cr previously downtrending, may consider re-consulting if patient's renal function continues to worsen   - monitor TLS labs q8h, now with uptrending LDH and hyperphosphatemia, although uric acid remains low   - c/w phosphate binder  - s/p bicarb drip, transitioned to PO bicarb   - c/w albumin with goal > 3.5 to assist with diuresis, dosed for IV lasix 40 9/4   - carnes placed for accurate I+Os    #Skin  - No sacral decubiti    #ID  - s/p Vanco and Zosyn (ended 8/31)   - U/A grossly positive, urine cx NGTD  - blood cx 8/28/21 NGTD  - strongyloides positive - s/p ivermectin (9/1 - 9/2) given IC state   - c/w INH + pyridoxine (given indeterminate quant gold)    #Endocrine  - SSI   - will readjust as necessary     #Hematologic/DVT ppx  - newly diagnosed diffuse large B-cell lymphoma  - TLS labs q8  - Allopurinol for TLS prophylaxis  - Heme/Onc recommending starting steroids with dexamethasone, s/p 40 mg dose (ended 8/29, 8/31)   - s/p Rituxan 8/28/21  - s/p cyclophosphamide (1st cycle 9/1, 2nd cycle 9/2, 3rd cycle 9/3)  - s/p doxorubicin 9/4  - restarting doxorubicin/etoposide on 9/10   - will hold filgastrim 300 mg IV for chemotherapy  - as per oncology, will resume chemotherapy once trach is done  - DVT prophylaxis with heparin subQ    #Ethics  - Patient is FULL CODE per palliative care discussion with patient and her sons (Yuan and Jose)  - Son (Yuan) and father still deciding on whether they would want a trach, understanding that this would be the pt's only option     Fill-in proxy is Jose Tellezon: [phone]   64-year-old woman with PMH significant for HTN, HLD, L hydroureteronephrosis s/p renal stent on 7/15, who presents with volume overload 2/2 high grade B-cell lymphoma. S/p thoracentesis 8/13, paracentesis and excisional biopsy of left inguinal lymph node on 8/20. Accepted to MICU for acute hypoxic/hypercapneic respiratory failure now s/p intubation and L pleurex catheter placement, now undergoing chemotherapy.    #Neuro  - Altered mental status, likely 2/2 multifactorial in the setting of hypercarbic respiratory failure  - CTH with no intracranial pathology  - Concern for lymphomatous meningitis, however ruled out s/p LP with flow cytometry and cytopathology negative  - Currently sedated with precedex but following commands - will wean as tolerated    #Cardiovascular  Vasoplegic shock 2/2 to sedatives   - Echo 8/3 showing concentric left ventricular remodeling, hyperdynamic left ventricle, calcified trileaflet aortic valve with normal opening, EF 56%  - POCUS showing adequate cardiac output  - On pressor support with levo, wean as tolerated, continue with midodrine 20 mg TID    #Respiratory  - Hypoxic/hypercapneic respiratory failure likely secondary to malignant pleural effusions from malignancy  - S/p thoracentesis on 8/13 with re-accumulation of pleural effusions  - S/p second thoracentesis 8/25, s/p L pleurex 8/27  - On pressure support, however remains tachypneic with low TVs, will repeat thoracentesis on R side before attempting extubation   - Unsuccessful breathing trials, will require future trach once platelets stabilize    #GI/Nutrition  Malignant ascites   - S/p paracentesis 8/20  - Still remains ascites, possible paracentesis    Ileus   - hold tube feeds as pt with residuals and persistent nausea 2/2 chemotherapy   - abd x-ray ordered  - zofran prn    #diarrhea   - previously constipated s/p golytely + fecal disimpaction    #/Renal  - PURVI likely 2/2 to ATN in the setting of previous supratherapeutic vancomycin level + tumor lysis + IV contrast  - B/L hydronephrosis stable on CT A/P 2/2 malignant LAD  - Nephrology on board, recommend holding LASIX & other nephrotoxic medications.   - Nephrostomy tubes considered however per IR recommendations not appropriate at this time as patient's Cr previously downtrending, may consider re-consulting if patient's renal function continues to worsen   - monitor TLS labs q8h, now with uptrending LDH and hyperphosphatemia, although uric acid remains low   - c/w phosphate binder  - s/p bicarb drip, transitioned to PO bicarb   - carnes placed for accurate I+Os    #Skin  - No sacral decubiti    #ID  - s/p Vanco and Zosyn (ended 8/31)   - U/A grossly positive, urine cx NGTD  - blood cx 8/28/21 NGTD  - strongyloides positive - s/p ivermectin (9/1 - 9/2) given IC state   - c/w INH + pyridoxine (given indeterminate quant gold)    #Endocrine  - SSI   - will readjust as necessary     #Hematologic/DVT ppx  - newly diagnosed diffuse large B-cell lymphoma  - TLS labs q8  - Allopurinol for TLS prophylaxis  - Heme/Onc recommending starting steroids with dexamethasone, s/p 40 mg dose (ended 8/29, 8/31)   - s/p Rituxan 8/28/21  - s/p cyclophosphamide (1st cycle 9/1, 2nd cycle 9/2, 3rd cycle 9/3)  - s/p doxorubicin 9/4  - restarting doxorubicin/etoposide on 9/10   - will hold filgastrim 300 mg IV for chemotherapy  - as per oncology, will resume chemotherapy once trach is done  - DVT prophylaxis with SCDs, no pharmacological px    #Ethics  - Patient is FULL CODE per palliative care discussion with patient and her sons (Yuan and Jose)  - Son (Yuan) and father still deciding on whether they would want a trach, understanding that this would be the pt's only option     Fill-in proxy is Jose Camargo: [phone]   no dysuria/no blood in stool/no burning urination/no hematuria

## 2021-09-28 ENCOUNTER — RESULT REVIEW (OUTPATIENT)
Age: 31
End: 2021-09-28

## 2021-11-23 NOTE — OB RN TRIAGE NOTE - NS_TRIAGEPROVIDERNOTIFIED_OBGYN_ALL_OB_FT
Yunior Giang (DO)  Cardiovascular Disease; Internal Medicine; Nuclear Cardiology  [address] [place] [address], [place] [address]  Phone: [phone]  Fax: [phone]  Follow Up Time: 1 week   MD Ramsey

## 2022-04-01 NOTE — PATIENT PROFILE OB - CURRENT PREGNANCY COMPLICATIONS, OB PROFILE
-- DO NOT REPLY / DO NOT REPLY ALL --  -- Message is from the [facility]--    General Patient Message      Reason for Call: Patient is following up message sent via [employer] luis carlos on March 29th and would like a call back. Message:  For the last week or so, I am experiencing burning and itchy feet, and they seem to tingle at times. Mainly night.  The day time is less.  I am also experiencing what feels like \"skin crawling\".  Very odd.    I have used some athletics foot cream...which helps a bit.    I forgot, the skin crawling sensation seems to follow where I have hair.  If that makes any sense.        Caller Information       Type Contact Phone    04/01/2022 10:31 AM CDT Phone (Incoming) Shiva Barron (Self) [phone] (M)          Alternative phone number: N/A    Turnaround time given to caller:   \"This message will be sent to [state Provider's name]. The clinical team will fulfill your request as soon as they review your message.\"    
Pt responded also called to clarify pharmacy and resent rx  
Tell patient to check his portal message
none

## 2022-10-06 ENCOUNTER — RESULT REVIEW (OUTPATIENT)
Age: 32
End: 2022-10-06

## 2022-12-21 NOTE — OB RN INTRAOPERATIVE NOTE - NS_DELIVERYRN_OBGYN_ALL_OB_FT
Patient is in the prone position.   The body was positioned using the following devices: safety strap. deion rnc

## 2023-01-01 NOTE — OB RN PATIENT PROFILE - PRESSURE ULCER(S)
Birth Weight:3810 g  8lb 6oz    Percentage of weight loss: -5.9    Current Weight:3585g 7lb 14oz    [place] Screen:In progress    Hepatitis B Vaccine: Given    Critical Congenital Heart Disease (CCHD): Done ()    [facility] Hearing Screen                 Date of Screenin23                   Right Ear Result: Pass                      Left Ear Result:Pass    Other: 24hr TCB:3.7      Call your baby’s doctor if your baby has any of these signs or symptoms:  Fever/Sickness  Temperature of 100.4F (38C) or higher   Vomiting that is green, bloody, or projectile   New or unusual rash   Dehydration:   Dry or cracked lips, tongue, or skin   Increased sleepiness or irritability  Feeding  Unable to arouse for feedings   Eating poorly or refusing to eat   Repeated vomiting/choking during feedings   Is not eating at least 8 times in 24 hours  Behavior  Listlessness (doesn’t have much energy)   Unusually sleepy or hard to wake up   Inconsolable, very fussy   Sharp, high-pitched cry or crying excessively with no known cause  Falls from any height or hits their head  Skin Color  Jaundice (yellow skin or eyes)   Pale or blue colored skin   White patches found in ’s mouth  Umbilical Cord   Skin around the base of the cord is red   Foul odor or drainage from the cord  Circumcision  Bright red bleeding, swelling, yellow or green drainage, or foul smell   Plastic ring remains in place after 10 days  Excessive redness around tip of the penis after 3 to 5 days   Does not urinate within 6-8 hours of the circumcision procedure  Bowel & Bladder  Constipation (hard, pebble-like stool) or no stools for 48 hours   Diarrhea (loose, watery stools), especially if there is mucus, blood, or foul smell   Has fewer than 6 wet diapers a day  Car Seat Safety  [place] law requires children under age 2 to be properly secured in a rear-facing child restraint system until age 2, unless they weigh more than 40 pounds or are more than 40  inches tall  Never install a rear-facing car seat in front of an active airbag  Do not use a safety seat that is more than 6 years old or past the expiration date stamped on the seat  [place] your safety seat with the  so you will be notified of any recalls  Do not attach anything to the car safety seat unless the  allows its use. This includes toys, mirrors, window shades and belt tightening tools  [place] law requires all children under age 8 to be secured in an appropriate child safety seat  Keep children in the back seat until they reach age 13  For more information visit www.[place].gov    If you are feeling as if you cannot deal with your baby's crying and you have met the baby's basic needs (clean diaper, fed, appropriate clothes, gently rocked, held, etc.), then stop, think and reach out for help if you need it. There may be times when nothing you do will stop the crying. . . this normal.   DO NOT SHAKE YOUR BABY.   If you think your baby has been shaken, go to the emergency room        Our hospital and medical team have enjoyed caring for you and your baby during this special time. If you have additional questions about these discharge instructions, please call [phone].   For breastfeeding support or questions, please contact our lactation consultants at [phone].        Want to Say “Thank You” to a Nurse?  The HUNG Award® was created in memory of MAKAYLA Guevara by his family to say thank you to bedside nurses who provide an outstanding level of care.  Submit a nomination using any method below.     OR    [email or web address]            no

## 2023-04-04 NOTE — OB PROVIDER H&P - ANESTHESIA, PREVIOUS REACTION, PROFILE
Continue Regimen: Skyrizi 150 mg/mL subcutaneous pen injector, betamethasone valerate 0.1 % lotion none Detail Level: Zone Render In Strict Bullet Format?: No

## 2023-04-27 ENCOUNTER — TRANSCRIPTION ENCOUNTER (OUTPATIENT)
Age: 33
End: 2023-04-27

## 2023-09-29 NOTE — ED STATDOCS - NS_ATTENDINGSCRIBE_ED_ALL_ED
I personally performed the service described in the documentation recorded by the scribe in my presence, and it accurately and completely records my words and actions. Detail Level: Simple Instructions: This plan will send the code FBSD to the PM system.  DO NOT or CHANGE the price. Price (Do Not Change): 0.00

## 2023-11-28 NOTE — OB PROVIDER H&P - FAMILY HISTORY
Patient : Juwan Talley Age: 58 year old Sex: male   MRN: [id number] Encounter Date: 11/28/2023      History     Chief Complaint   Patient presents with   • Syncope     This is a 58-year-old male who presents to the emergency department for evaluation of a syncopal episode.  The patient was with his wife at an orthopedic appointment when he had a syncopal episode.  The patient states that nothing like this has happened in the past.  A 1st response was called and the patient was transported to the emergency department for evaluation.  Patient states that he did not strike his head but is feeling a bit nauseous.  The patient states that he has been dealing with a right shoulder rash over the past week.  Patient states that his rash is painful and itchy.  Patient states that he is on a water pill and takes potassium supplementation.          No Known Allergies    Current Discharge Medication List      Prior to Admission Medications    Details   potassium chloride (KLOR-CON M) 10 MEQ ayana ER tablet TAKE 1 TABLET BY MOUTH DAILY  Qty: 90 tablet, Refills: 0      losartan (COZAAR) 100 MG tablet Take 1 tablet by mouth daily.  Qty: 90 tablet, Refills: 1      chlorthalidone (THALITONE) 25 MG tablet Take 1 tablet by mouth daily.  Qty: 90 tablet, Refills: 1      rosuvastatin (CRESTOR) 5 MG tablet Take 1 tablet by mouth daily.  Qty: 90 tablet, Refills: 1      sildenafil (VIAGRA) 50 MG tablet Take 1 tablet once daily as needed 1 hour before sexual activity; may be taken up to 4 hours before sexual activity. Reduce to 25 mg once daily if side effects occur. May increase to a maximum dose of 100 mg once daily if there is incomplete response.  Qty: 30 tablet, Refills: 0      IBUPROFEN PO Take 2 tablets by mouth as needed.      Acetaminophen (TYLENOL PO) Take 2 tablets by mouth as needed.         New Prescriptions    Details   Valacyclovir HCl (Valtrex) 1000 MG Tab Take 1 tablet by mouth in the morning and 1 tablet at noon and 1 tablet  in the evening. Do all this for 7 days.  Qty: 21 tablet, Refills: 0             Past Medical History:   Diagnosis Date   • Carpal tunnel syndrome 2006    right arm   • Complete tear of left rotator cuff 06/01/2023    S/p surgery   • COVID-19 2020    in fall   • Injury of ulnar nerve 2006    right arm   • Lateral epicondylitis 2006    right arm   • Rupture of distal biceps tendon 03/03/2015    S/p repair.   • Tear of medial cartilage or meniscus of knee, current 08/02/2011    S/p PARTIAL MEDIAL MENISCECTOMY 2011   • Traumatic tear of left rotator cuff 09/09/2022    S/p surgery       Past Surgical History:   Procedure Laterality Date   • ARTHROSCOPY KNEE MEDIAL OR LAT  07/13/2011    right   • ARTHROSCOPY OF JOINT UNLISTED      right knee - unsure of repair early 1980s   • KNEE SCOPE,DRILL OSTE DISS+INT FIX  07/13/2011    right   • REPAIR ING HERNIA,5+Y/O,REDUCIBL      as child   • SHOULDER SURG PROC UNLISTED Left 03/06/2023    LEFT SHOULDER ARTHROSCOPY, ACROMIOPLASTY, ROTATOR CUFF REPAIR(Dr. Remy)   • UPPER ARM/ELBOW SURGERY UNLISTED  02/16/2015    Left distal bicep repair & Extensor tendon repair Dr Remy       Family History   Problem Relation Age of Onset   • Patient is unaware of any medical problems Mother    • Hypertension Father    • Parkinsonism Father    • Patient is unaware of any medical problems Sister    • Patient is unaware of any medical problems Maternal Grandmother    • Cancer Maternal Grandfather         lung   • Diabetes Paternal Grandmother    • Patient is unaware of any medical problems Paternal Grandfather    • Patient is unaware of any medical problems Son    • Patient is unaware of any medical problems Son        Social History     Tobacco Use   • Smoking status: Some Days     Types: Cigars     Passive exposure: Yes   • Smokeless tobacco: Never   • Tobacco comments:     Occ cigars    Vaping Use   • Vaping Use: never used   Substance Use Topics   • Alcohol use: Yes     Alcohol/week: 5.0  standard drinks of alcohol     Types: 6 drink(s) per week     Comment: beer  just on week-ends   • Drug use: No       E-cigarette/Vaping   • E-Cigarette/Vaping Use Never Used      E-Cigarette/Vaping Substances & Devices       Review of Systems   Constitutional: Negative for activity change, appetite change, chills, fatigue and fever.   HENT: Negative for ear pain, rhinorrhea, sinus pressure and sore throat.    Respiratory: Negative for chest tightness and shortness of breath.    Cardiovascular: Negative for chest pain and palpitations.   Gastrointestinal: Negative for abdominal pain, diarrhea, nausea and vomiting.   Genitourinary: Negative for dysuria, flank pain and hematuria.   Musculoskeletal: Negative for back pain, gait problem, neck pain and neck stiffness.   Skin: Negative for color change, pallor, rash and wound.   Neurological: Positive for syncope and weakness. Negative for dizziness, light-headedness, numbness and headaches.   Hematological: Negative for adenopathy. Does not bruise/bleed easily.   Psychiatric/Behavioral: Negative for agitation and confusion. The patient is not nervous/anxious and is not hyperactive.        Physical Exam     ED Triage Vitals [11/28/23 1129]   ED Triage Vitals Group      Temp 98.4 °F (36.9 °C)      Heart Rate 87      Resp (!) 24      /77      SpO2 95 %      EtCO2 mmHg       Height       Weight 211 lb 6.7 oz (95.9 kg)      Weight Scale Used Scale in bed      BMI (Calculated)       IBW/kg (Calculated)        Physical Exam  Vitals and nursing note reviewed.   Constitutional:       General: He is not in acute distress.     Appearance: Normal appearance. He is well-developed. He is not ill-appearing, toxic-appearing or diaphoretic.   HENT:      Head: Normocephalic and atraumatic.      Right Ear: No drainage, swelling or tenderness. No middle ear effusion. Tympanic membrane is not erythematous or bulging.      Left Ear: No drainage, swelling or tenderness.  No middle ear  effusion. Tympanic membrane is not erythematous or bulging.      Nose: Nose normal. No septal deviation or rhinorrhea.      Right Sinus: No maxillary sinus tenderness or frontal sinus tenderness.      Left Sinus: No maxillary sinus tenderness or frontal sinus tenderness.      Mouth/Throat:      Mouth: No oral lesions.      Dentition: No dental abscesses.      Pharynx: Uvula midline. No oropharyngeal exudate or posterior oropharyngeal erythema.   Eyes:      General:         Right eye: No discharge.         Left eye: No discharge.      Conjunctiva/sclera: Conjunctivae normal.      Pupils: Pupils are equal, round, and reactive to light.   Neck:      Thyroid: No thyroid mass or thyromegaly.      Vascular: No JVD.      Trachea: Trachea normal. No tracheal deviation.   Cardiovascular:      Rate and Rhythm: Normal rate and regular rhythm.      Pulses: Normal pulses.      Heart sounds: Normal heart sounds.   Pulmonary:      Effort: Pulmonary effort is normal. No accessory muscle usage or respiratory distress.      Breath sounds: Normal breath sounds. No stridor. No decreased breath sounds, wheezing, rhonchi or rales.   Chest:      Chest wall: No tenderness.   Abdominal:      General: Bowel sounds are normal. There is no distension.      Palpations: Abdomen is soft.      Tenderness: There is no abdominal tenderness. There is no guarding or rebound.   Genitourinary:     Comments: Exam deferred  Musculoskeletal:         General: No tenderness. Normal range of motion.      Cervical back: Normal range of motion and neck supple.   Skin:     General: Skin is warm and dry.      Capillary Refill: Capillary refill takes less than 2 seconds.      Coloration: Skin is not pale.      Findings: Rash present. No abrasion, bruising, ecchymosis or erythema.          Neurological:      General: No focal deficit present.      Mental Status: He is alert and oriented to person, place, and time. He is not disoriented.      Cranial Nerves: No  cranial nerve deficit.      Sensory: No sensory deficit.      Coordination: Coordination normal.   Psychiatric:         Mood and Affect: Mood normal.         Speech: Speech normal.         Behavior: Behavior normal.         Thought Content: Thought content normal.         Judgment: Judgment normal.         ED Course     Procedures    Lab Results     Results for orders placed or performed during the hospital encounter of 11/28/23   Comprehensive Metabolic Panel   Result Value Ref Range    Fasting Status      Sodium 137 135 - 145 mmol/L    Potassium 2.4 (LL) 3.4 - 5.1 mmol/L    Chloride 96 (L) 97 - 110 mmol/L    Carbon Dioxide 29 21 - 32 mmol/L    Anion Gap 14 7 - 19 mmol/L    Glucose 170 (H) 70 - 99 mg/dL    BUN 24 (H) 6 - 20 mg/dL    Creatinine 0.90 0.67 - 1.17 mg/dL    Glomerular Filtration Rate >90 >=60    BUN/Cr 27 (H) 7 - 25    Calcium 8.9 8.4 - 10.2 mg/dL    Bilirubin, Total 0.4 0.2 - 1.0 mg/dL    GOT/AST 24 <=37 Units/L    GPT/ALT 53 <64 Units/L    Alkaline Phosphatase 57 45 - 117 Units/L    Albumin 4.0 3.6 - 5.1 g/dL    Protein, Total 7.6 6.4 - 8.2 g/dL    Globulin 3.6 2.0 - 4.0 g/dL    A/G Ratio 1.1 1.0 - 2.4   TROPONIN I, HIGH SENSITIVITY   Result Value Ref Range    Troponin I, High Sensitivity 9 <77 ng/L   CBC with Automated Differential (performable only)   Result Value Ref Range    WBC 12.1 (H) 4.2 - 11.0 K/mcL    RBC 5.02 4.50 - 5.90 mil/mcL    HGB 15.5 13.0 - 17.0 g/dL    HCT 43.8 39.0 - 51.0 %    MCV 87.3 78.0 - 100.0 fl    MCH 30.9 26.0 - 34.0 pg    MCHC 35.4 32.0 - 36.5 g/dL    RDW-CV 12.0 11.0 - 15.0 %    RDW-SD 38.6 (L) 39.0 - 50.0 fL     140 - 450 K/mcL    NRBC 0 <=0 /100 WBC    Neutrophil, Percent 47 %    Lymphocytes, Percent 44 %    Mono, Percent 7 %    Eosinophils, Percent 1 %    Basophils, Percent 1 %    Immature Granulocytes 0 %    Absolute Neutrophils 5.7 1.8 - 7.7 K/mcL    Absolute Lymphocytes 5.3 (H) 1.0 - 4.0 K/mcL    Absolute Monocytes 0.8 0.3 - 0.9 K/mcL    Absolute Eosinophils   0.1 0.0 - 0.5 K/mcL    Absolute Basophils 0.1 0.0 - 0.3 K/mcL    Absolute Immature Granulocytes 0.0 0.0 - 0.2 K/mcL   GLUCOSE, BEDSIDE - POINT OF CARE   Result Value Ref Range    GLUCOSE, BEDSIDE - POINT OF CARE 174 (H) 70 - 99 mg/dL       EKG Results     EKG Interpretation  Rate: 87  Rhythm: normal sinus rhythm   Abnormality: no    EKG tracing interpreted by ED physician    Radiology Results     Imaging Results          XR CHEST PA OR AP 1 VIEW (Final result)  Result time 11/28/23 13:14:56    Final result                 Impression:    IMPRESSION: No acute cardiopulmonary disease.      Electronically Signed by: Wyatt Jung MD  Signed on: 11/28/2023 1:14 PM  Created on Workstation ID: [id number]  Signed on Workstation ID: [id number]             Narrative:    HISTORY: syncope    EXAM: AP chest x-ray    COMPARISON: None.    FINDINGS: The lungs are clear, with sharp costophrenic angles. The heart  size and pulmonary vasculature are normal. The mediastinal contour is  normal. There is no pneumothorax.                               CT HEAD WO CONTRAST (Final result)  Result time 11/28/23 13:14:33    Final result                 Impression:    IMPRESSION:  1. No acute intracranial hemorrhage, extraaxial fluid collection, shift of  the midline, or other mass effect.  2. Mild sinusitis, as above.  3. No acute calvarial or skull base fracture    Electronically Signed by: Wyatt Jung MD  Signed on: 11/28/2023 1:14 PM  Created on Workstation ID: [id number]  Signed on Workstation ID: [id number]             Narrative:    HISTORY: Seizures and syncopal episode.    EXAM: Head CT.    COMPARISON: None.    TECHNIQUE: Noncontrast CT scan of the head was performed with thin-cut  axial images. Multiplanar reformatted images were created and reviewed at a  dedicated workstation.    FINDINGS: There is no acute intracranial hemorrhage, extraaxial fluid  collection, shift of the midline, or other mass effect.    The ventricular  system and basal cisterns are normal in size and  bilaterally symmetric. The gray-white interface is well-maintained on this  noncontrast exam.    There are no bony lesions or basilar skull fractures. The mastoid air cells  are well-aerated. There is some mild mucosal thickening in the bilateral  ethmoid air cells and the base of the bilateral frontal sinuses.                                ED Medication Orders (From admission, onward)    Ordered Start     Status Ordering Provider    11/28/23 1213 11/28/23 1214  potassium CHLORIDE (KLOR-CON M) ayana ER tablet 40 mEq  ONCE         Last MAR action: Given GAMA BROOKS    11/28/23 1130 11/28/23 1131  sodium chloride (NORMAL SALINE) 0.9 % bolus 1,000 mL  ONCE         Last MAR action: Completed GAMA BROOKS               Medical Decision Making    Patient has been seen and examined.  Patient has been given IV fluids.  We will do lab work and imaging.      Patient's lab work has been reviewed and interpreted by myself.  Patient's BUN is elevated.  Patient's potassium is 2.4.    Patient has been given potassium orally here.      Patient's imaging has been reviewed by myself. Patient's imaging does not show any acute findings. Please see the radiology final reading/interpretation regarding the patient's imaging.    Patient will be treated with Valtrex for her shingles.    Patient has been instructed to double his potassium over the next 4 days.  Patient will also supplement with his diet.  All questions have been answered and the patient will follow up as directed.    Clinical Impression     ED Diagnosis   1. Syncope and collapse        2. Hypokalemia        3. Herpes zoster with complication            Disposition        Discharge 11/28/2023  1:33 PM  Juwan Talley discharge to home/self care.      ED Course/MDM:    Diagnosis  The primary encounter diagnosis was Syncope and collapse. Diagnoses of Hypokalemia and Herpes zoster with complication were also pertinent to  this visit.    Follow Up:  Shaheed Poole MD  [address] [facility] [address]  [phone]    In 3 days         Current Discharge Medication List      New Prescriptions    Details   Valacyclovir HCl (Valtrex) 1000 MG Tab Take 1 tablet by mouth in the morning and 1 tablet at noon and 1 tablet in the evening. Do all this for 7 days.  Qty: 21 tablet, Refills: 0             Disposition:  Discharge 11/28/2023  1:33 PM  Juwan Talley discharge to home/self care.          Parrish Shirley, DO  11/28/23 1336       Parrish Shirley, DO  11/28/23 1340     No pertinent family history in first degree relatives

## 2023-12-05 ENCOUNTER — TRANSCRIPTION ENCOUNTER (OUTPATIENT)
Age: 33
End: 2023-12-05

## 2023-12-05 ENCOUNTER — INPATIENT (INPATIENT)
Facility: HOSPITAL | Age: 33
LOS: 0 days | Discharge: HOME | End: 2023-12-06
Payer: COMMERCIAL

## 2023-12-05 VITALS
TEMPERATURE: 98 F | SYSTOLIC BLOOD PRESSURE: 126 MMHG | DIASTOLIC BLOOD PRESSURE: 78 MMHG | RESPIRATION RATE: 17 BRPM | HEART RATE: 84 BPM

## 2023-12-05 DIAGNOSIS — Z98.891 HISTORY OF UTERINE SCAR FROM PREVIOUS SURGERY: Chronic | ICD-10-CM

## 2023-12-05 DIAGNOSIS — K08.409 PARTIAL LOSS OF TEETH, UNSPECIFIED CAUSE, UNSPECIFIED CLASS: Chronic | ICD-10-CM

## 2023-12-05 DIAGNOSIS — O26.899 OTHER SPECIFIED PREGNANCY RELATED CONDITIONS, UNSPECIFIED TRIMESTER: ICD-10-CM

## 2023-12-05 DIAGNOSIS — Z90.49 ACQUIRED ABSENCE OF OTHER SPECIFIED PARTS OF DIGESTIVE TRACT: Chronic | ICD-10-CM

## 2023-12-05 DIAGNOSIS — Z90.89 ACQUIRED ABSENCE OF OTHER ORGANS: Chronic | ICD-10-CM

## 2023-12-05 LAB
BASOPHILS # BLD AUTO: 0.02 K/UL — SIGNIFICANT CHANGE UP (ref 0–0.2)
BASOPHILS # BLD AUTO: 0.02 K/UL — SIGNIFICANT CHANGE UP (ref 0–0.2)
BASOPHILS NFR BLD AUTO: 0.3 % — SIGNIFICANT CHANGE UP (ref 0–2)
BASOPHILS NFR BLD AUTO: 0.3 % — SIGNIFICANT CHANGE UP (ref 0–2)
BLD GP AB SCN SERPL QL: SIGNIFICANT CHANGE UP
BLD GP AB SCN SERPL QL: SIGNIFICANT CHANGE UP
EOSINOPHIL # BLD AUTO: 0.09 K/UL — SIGNIFICANT CHANGE UP (ref 0–0.5)
EOSINOPHIL # BLD AUTO: 0.09 K/UL — SIGNIFICANT CHANGE UP (ref 0–0.5)
EOSINOPHIL NFR BLD AUTO: 1.3 % — SIGNIFICANT CHANGE UP (ref 0–6)
EOSINOPHIL NFR BLD AUTO: 1.3 % — SIGNIFICANT CHANGE UP (ref 0–6)
HCT VFR BLD CALC: 35.4 % — SIGNIFICANT CHANGE UP (ref 34.5–45)
HCT VFR BLD CALC: 35.4 % — SIGNIFICANT CHANGE UP (ref 34.5–45)
HCV AB S/CO SERPL IA: 0.17 S/CO — SIGNIFICANT CHANGE UP (ref 0–0.99)
HCV AB S/CO SERPL IA: 0.17 S/CO — SIGNIFICANT CHANGE UP (ref 0–0.99)
HCV AB SERPL-IMP: SIGNIFICANT CHANGE UP
HCV AB SERPL-IMP: SIGNIFICANT CHANGE UP
HGB BLD-MCNC: 12.2 G/DL — SIGNIFICANT CHANGE UP (ref 11.5–15.5)
HGB BLD-MCNC: 12.2 G/DL — SIGNIFICANT CHANGE UP (ref 11.5–15.5)
IMM GRANULOCYTES NFR BLD AUTO: 0.4 % — SIGNIFICANT CHANGE UP (ref 0–0.9)
IMM GRANULOCYTES NFR BLD AUTO: 0.4 % — SIGNIFICANT CHANGE UP (ref 0–0.9)
LYMPHOCYTES # BLD AUTO: 1.78 K/UL — SIGNIFICANT CHANGE UP (ref 1–3.3)
LYMPHOCYTES # BLD AUTO: 1.78 K/UL — SIGNIFICANT CHANGE UP (ref 1–3.3)
LYMPHOCYTES # BLD AUTO: 25.8 % — SIGNIFICANT CHANGE UP (ref 13–44)
LYMPHOCYTES # BLD AUTO: 25.8 % — SIGNIFICANT CHANGE UP (ref 13–44)
MCHC RBC-ENTMCNC: 27.2 PG — SIGNIFICANT CHANGE UP (ref 27–34)
MCHC RBC-ENTMCNC: 27.2 PG — SIGNIFICANT CHANGE UP (ref 27–34)
MCHC RBC-ENTMCNC: 34.5 GM/DL — SIGNIFICANT CHANGE UP (ref 32–36)
MCHC RBC-ENTMCNC: 34.5 GM/DL — SIGNIFICANT CHANGE UP (ref 32–36)
MCV RBC AUTO: 78.8 FL — LOW (ref 80–100)
MCV RBC AUTO: 78.8 FL — LOW (ref 80–100)
MONOCYTES # BLD AUTO: 0.52 K/UL — SIGNIFICANT CHANGE UP (ref 0–0.9)
MONOCYTES # BLD AUTO: 0.52 K/UL — SIGNIFICANT CHANGE UP (ref 0–0.9)
MONOCYTES NFR BLD AUTO: 7.5 % — SIGNIFICANT CHANGE UP (ref 2–14)
MONOCYTES NFR BLD AUTO: 7.5 % — SIGNIFICANT CHANGE UP (ref 2–14)
NEUTROPHILS # BLD AUTO: 4.45 K/UL — SIGNIFICANT CHANGE UP (ref 1.8–7.4)
NEUTROPHILS # BLD AUTO: 4.45 K/UL — SIGNIFICANT CHANGE UP (ref 1.8–7.4)
NEUTROPHILS NFR BLD AUTO: 64.7 % — SIGNIFICANT CHANGE UP (ref 43–77)
NEUTROPHILS NFR BLD AUTO: 64.7 % — SIGNIFICANT CHANGE UP (ref 43–77)
PLATELET # BLD AUTO: 210 K/UL — SIGNIFICANT CHANGE UP (ref 150–400)
PLATELET # BLD AUTO: 210 K/UL — SIGNIFICANT CHANGE UP (ref 150–400)
RBC # BLD: 4.49 M/UL — SIGNIFICANT CHANGE UP (ref 3.8–5.2)
RBC # BLD: 4.49 M/UL — SIGNIFICANT CHANGE UP (ref 3.8–5.2)
RBC # FLD: 14.8 % — HIGH (ref 10.3–14.5)
RBC # FLD: 14.8 % — HIGH (ref 10.3–14.5)
T PALLIDUM AB TITR SER: NEGATIVE — SIGNIFICANT CHANGE UP
T PALLIDUM AB TITR SER: NEGATIVE — SIGNIFICANT CHANGE UP
WBC # BLD: 6.89 K/UL — SIGNIFICANT CHANGE UP (ref 3.8–10.5)
WBC # BLD: 6.89 K/UL — SIGNIFICANT CHANGE UP (ref 3.8–10.5)
WBC # FLD AUTO: 6.89 K/UL — SIGNIFICANT CHANGE UP (ref 3.8–10.5)
WBC # FLD AUTO: 6.89 K/UL — SIGNIFICANT CHANGE UP (ref 3.8–10.5)

## 2023-12-05 RX ORDER — LANOLIN
1 OINTMENT (GRAM) TOPICAL EVERY 6 HOURS
Refills: 0 | Status: DISCONTINUED | OUTPATIENT
Start: 2023-12-05 | End: 2023-12-06

## 2023-12-05 RX ORDER — CITRIC ACID/SODIUM CITRATE 300-500 MG
30 SOLUTION, ORAL ORAL ONCE
Refills: 0 | Status: COMPLETED | OUTPATIENT
Start: 2023-12-05 | End: 2023-12-05

## 2023-12-05 RX ORDER — TETANUS TOXOID, REDUCED DIPHTHERIA TOXOID AND ACELLULAR PERTUSSIS VACCINE, ADSORBED 5; 2.5; 8; 8; 2.5 [IU]/.5ML; [IU]/.5ML; UG/.5ML; UG/.5ML; UG/.5ML
0.5 SUSPENSION INTRAMUSCULAR ONCE
Refills: 0 | Status: DISCONTINUED | OUTPATIENT
Start: 2023-12-05 | End: 2023-12-06

## 2023-12-05 RX ORDER — OXYCODONE HYDROCHLORIDE 5 MG/1
5 TABLET ORAL ONCE
Refills: 0 | Status: DISCONTINUED | OUTPATIENT
Start: 2023-12-05 | End: 2023-12-06

## 2023-12-05 RX ORDER — SODIUM CHLORIDE 9 MG/ML
1000 INJECTION, SOLUTION INTRAVENOUS
Refills: 0 | Status: DISCONTINUED | OUTPATIENT
Start: 2023-12-05 | End: 2023-12-06

## 2023-12-05 RX ORDER — OXYTOCIN 10 UNIT/ML
333.33 VIAL (ML) INJECTION
Qty: 20 | Refills: 0 | Status: DISCONTINUED | OUTPATIENT
Start: 2023-12-05 | End: 2023-12-06

## 2023-12-05 RX ORDER — ACETAMINOPHEN 500 MG
975 TABLET ORAL
Refills: 0 | Status: DISCONTINUED | OUTPATIENT
Start: 2023-12-05 | End: 2023-12-06

## 2023-12-05 RX ORDER — ENOXAPARIN SODIUM 100 MG/ML
40 INJECTION SUBCUTANEOUS EVERY 24 HOURS
Refills: 0 | Status: DISCONTINUED | OUTPATIENT
Start: 2023-12-05 | End: 2023-12-06

## 2023-12-05 RX ORDER — MAGNESIUM HYDROXIDE 400 MG/1
30 TABLET, CHEWABLE ORAL
Refills: 0 | Status: DISCONTINUED | OUTPATIENT
Start: 2023-12-05 | End: 2023-12-06

## 2023-12-05 RX ORDER — KETOROLAC TROMETHAMINE 30 MG/ML
30 SYRINGE (ML) INJECTION EVERY 6 HOURS
Refills: 0 | Status: DISCONTINUED | OUTPATIENT
Start: 2023-12-05 | End: 2023-12-06

## 2023-12-05 RX ORDER — AZITHROMYCIN 500 MG/1
500 TABLET, FILM COATED ORAL ONCE
Refills: 0 | Status: COMPLETED | OUTPATIENT
Start: 2023-12-05 | End: 2023-12-05

## 2023-12-05 RX ORDER — FAMOTIDINE 10 MG/ML
20 INJECTION INTRAVENOUS ONCE
Refills: 0 | Status: COMPLETED | OUTPATIENT
Start: 2023-12-05 | End: 2023-12-05

## 2023-12-05 RX ORDER — SODIUM CHLORIDE 9 MG/ML
1000 INJECTION, SOLUTION INTRAVENOUS
Refills: 0 | Status: DISCONTINUED | OUTPATIENT
Start: 2023-12-05 | End: 2023-12-05

## 2023-12-05 RX ORDER — IBUPROFEN 200 MG
600 TABLET ORAL EVERY 6 HOURS
Refills: 0 | Status: COMPLETED | OUTPATIENT
Start: 2023-12-05 | End: 2024-11-02

## 2023-12-05 RX ORDER — SODIUM CHLORIDE 9 MG/ML
1000 INJECTION, SOLUTION INTRAVENOUS ONCE
Refills: 0 | Status: COMPLETED | OUTPATIENT
Start: 2023-12-05 | End: 2023-12-05

## 2023-12-05 RX ORDER — TRANEXAMIC ACID 100 MG/ML
1000 INJECTION, SOLUTION INTRAVENOUS ONCE
Refills: 0 | Status: COMPLETED | OUTPATIENT
Start: 2023-12-05 | End: 2023-12-05

## 2023-12-05 RX ORDER — SCOPALAMINE 1 MG/3D
1 PATCH, EXTENDED RELEASE TRANSDERMAL ONCE
Refills: 0 | Status: COMPLETED | OUTPATIENT
Start: 2023-12-05 | End: 2023-12-05

## 2023-12-05 RX ORDER — OXYTOCIN 10 UNIT/ML
333.33 VIAL (ML) INJECTION
Qty: 20 | Refills: 0 | Status: DISCONTINUED | OUTPATIENT
Start: 2023-12-05 | End: 2023-12-05

## 2023-12-05 RX ORDER — ACETAMINOPHEN 500 MG
975 TABLET ORAL ONCE
Refills: 0 | Status: COMPLETED | OUTPATIENT
Start: 2023-12-05 | End: 2023-12-05

## 2023-12-05 RX ORDER — GENTAMICIN SULFATE 40 MG/ML
360 VIAL (ML) INJECTION ONCE
Refills: 0 | Status: COMPLETED | OUTPATIENT
Start: 2023-12-05 | End: 2023-12-05

## 2023-12-05 RX ORDER — SIMETHICONE 80 MG/1
80 TABLET, CHEWABLE ORAL EVERY 4 HOURS
Refills: 0 | Status: DISCONTINUED | OUTPATIENT
Start: 2023-12-05 | End: 2023-12-06

## 2023-12-05 RX ORDER — OXYCODONE HYDROCHLORIDE 5 MG/1
5 TABLET ORAL
Refills: 0 | Status: DISCONTINUED | OUTPATIENT
Start: 2023-12-05 | End: 2023-12-06

## 2023-12-05 RX ORDER — DIPHENHYDRAMINE HCL 50 MG
25 CAPSULE ORAL EVERY 6 HOURS
Refills: 0 | Status: DISCONTINUED | OUTPATIENT
Start: 2023-12-05 | End: 2023-12-06

## 2023-12-05 RX ADMIN — Medication 975 MILLIGRAM(S): at 14:51

## 2023-12-05 RX ADMIN — Medication 30 MILLILITER(S): at 05:36

## 2023-12-05 RX ADMIN — SCOPALAMINE 1 PATCH: 1 PATCH, EXTENDED RELEASE TRANSDERMAL at 05:44

## 2023-12-05 RX ADMIN — Medication 100 MILLIGRAM(S): at 14:50

## 2023-12-05 RX ADMIN — Medication 975 MILLIGRAM(S): at 20:10

## 2023-12-05 RX ADMIN — Medication 1000 MILLIUNIT(S)/MIN: at 07:00

## 2023-12-05 RX ADMIN — SODIUM CHLORIDE 2000 MILLILITER(S): 9 INJECTION, SOLUTION INTRAVENOUS at 05:46

## 2023-12-05 RX ADMIN — Medication 30 MILLIGRAM(S): at 23:11

## 2023-12-05 RX ADMIN — TRANEXAMIC ACID 220 MILLIGRAM(S): 100 INJECTION, SOLUTION INTRAVENOUS at 06:30

## 2023-12-05 RX ADMIN — SODIUM CHLORIDE 125 MILLILITER(S): 9 INJECTION, SOLUTION INTRAVENOUS at 05:46

## 2023-12-05 RX ADMIN — Medication 30 MILLIGRAM(S): at 06:37

## 2023-12-05 RX ADMIN — FAMOTIDINE 20 MILLIGRAM(S): 10 INJECTION INTRAVENOUS at 05:36

## 2023-12-05 RX ADMIN — Medication 975 MILLIGRAM(S): at 05:44

## 2023-12-05 RX ADMIN — Medication 100 MILLIGRAM(S): at 06:16

## 2023-12-05 RX ADMIN — Medication 272 MILLIGRAM(S): at 06:34

## 2023-12-05 RX ADMIN — Medication 30 MILLIGRAM(S): at 17:16

## 2023-12-05 RX ADMIN — AZITHROMYCIN 255 MILLIGRAM(S): 500 TABLET, FILM COATED ORAL at 07:57

## 2023-12-05 RX ADMIN — ENOXAPARIN SODIUM 40 MILLIGRAM(S): 100 INJECTION SUBCUTANEOUS at 20:11

## 2023-12-05 NOTE — OB PROVIDER DELIVERY SUMMARY - MLM HIDDEN
PRINCIPAL DISCHARGE DIAGNOSIS  Diagnosis: Arthritis of left knee  Assessment and Plan of Treatment:        yes

## 2023-12-05 NOTE — OB RN DELIVERY SUMMARY - NS_DELIVERYROOM_OBGYN_ALL_OB_FT
OR 1

## 2023-12-05 NOTE — OB PROVIDER H&P - NSICDXPASTSURGICALHX_GEN_ALL_CORE_FT
PAST SURGICAL HISTORY:  History of  delivery 2017    History of tonsillectomy     S/P cholecystectomy     [place] teeth extracted      PAST SURGICAL HISTORY:  History of  delivery 2017    History of tonsillectomy     S/P cholecystectomy     [place] teeth extracted

## 2023-12-05 NOTE — OB PROVIDER H&P - ASSESSMENT
Patient is a 33 y.o.  at 37w4d GA admitted to L&D for repeat  section in the setting of PROM. Cat I FHT.   - consents  - admission labs  - Diet: NPO  - IV hydration  - continuous toco and fetal heart monitoring until surgery  - preop antibiotics: clindamycin  - GBS neg    Discussed with Dr. Montalvo

## 2023-12-05 NOTE — OB RN PATIENT PROFILE - NSICDXPASTSURGICALHX_GEN_ALL_CORE_FT
PAST SURGICAL HISTORY:  History of  delivery 2017    History of tonsillectomy     S/P cholecystectomy     [place] teeth extracted      PAST SURGICAL HISTORY:  History of  delivery 2017    History of tonsillectomy     S/P cholecystectomy     [place] teeth extracted

## 2023-12-05 NOTE — OB RN TRIAGE NOTE - FALL HARM RISK - PT AGE POPULATION HIDDEN
Met with pt in exam room, sx sched for 6/7/2022 at [facility] per his req for next available. Rev health hx, pt cleared by Dr Farias. Rev sx protocols and instructions, pt rec'd copy of same, 1st PO sched.  LW   Adult

## 2023-12-05 NOTE — OB RN TRIAGE NOTE - NS_BABIESUTERO_OBGYN_ALL_OB_NU
1 Bexarotene Pregnancy And Lactation Text: This medication is Pregnancy Category X and should not be given to women who are pregnant or may become pregnant. This medication should not be used if you are breast feeding.

## 2023-12-05 NOTE — OB PROVIDER H&P - HISTORY OF PRESENT ILLNESS
AN TORRES is a 33y  at 37w4d GA who presents to L&D for rupture of membranes. The patient reports her water broke while asleep in bed around 0330 this morning. She stood up and noticed a gush of fluid onto the floor. Around this time, the patient reports painful contractions every 5-10 minutes apart. Nitrazine positive on arrival to triage. Patient reports good fetal movement. Denies vaginal bleeding.       Pt denies trauma.   Pt denies headaches, visual disturbances, RUQ pain, epigastric pain and new-onset edema.   She denies any urinary complaints.   She denies fevers, chills, nausea, vomiting.   She denies shortness of breath, chest pain, and palpitations.    Pregnancy course is uncomplicated.     POB:   SAB x1  G1: pCS for nrFHT (2017)  G2: rCS (2019)  PGYN: -fibroids/-cysts, denied STD hx, denies abnormal PAPs  PMH: denies  PSH: C/S x2, cholecystectomy, tonsillectomy   SH: Denies tobacco use, EtOH use and illicit drug use during the pregnancy; Feels safe at home  Meds: PNV  All: penicillin, amoxicillin, Keflex, apples

## 2023-12-05 NOTE — OB RN DELIVERY SUMMARY - NS_PROPHYLACTICABXDOSE_OBGYN_ALL_OB_FT
Regarding: ankle/foot swollen  ----- Message from Randee Krause sent at 6/5/2023  7:15 AM CDT -----  Patient Name: Mathew Cantu  Caller: patient   Call Back # : [phone]    Is this for an Active episode for Home Health, Hospice or Palliative Care? No   Specialist or PCP Name: Fabien Kayla  Symptoms: ankle/foot swollen  Pregnant (females aged 13-60. If Yes, how long?) : no  Name of Clinic Site / Acct# : [facility] [place]      Are you currently at (or near) your place of residence? Yes [place] [address]     Use following scripting for patients waiting for a callback:   \"Nurse callback times vary based on call volumes; please be aware the return phone call may come from an unidentified or out of state phone number. If your symptoms worsen or become life threatening while waiting, you should seek immediate assistance by calling 911 or going to the ER for evaluation.\"     900mg

## 2023-12-05 NOTE — OB RN PATIENT PROFILE - FUNCTIONAL ASSESSMENT - BASIC MOBILITY 6.
4-calculated by average/Not able to assess (calculate score using [facility] averaging method)  4-calculated by average/Not able to assess (calculate score using [facility] averaging method)

## 2023-12-05 NOTE — OB RN TRIAGE NOTE - NSICDXPASTSURGICALHX_GEN_ALL_CORE_FT
PAST SURGICAL HISTORY:  History of  delivery 2017    History of tonsillectomy     S/P cholecystectomy     [place] teeth extracted      PAST SURGICAL HISTORY:  History of  delivery 2017    History of tonsillectomy     S/P cholecystectomy     [place] teeth extracted

## 2023-12-05 NOTE — OB RN DELIVERY SUMMARY - NS_SEPSISRSKCALC_OBGYN_ALL_OB_FT
EOS calculated successfully. EOS Risk Factor: 0.5/1000 live births ([facility] national incidence); GA=37w4d; Temp=98.4; ROM=3.067; GBS='Negative'; Antibiotics='No antibiotics or any antibiotics < 2 hrs prior to birth'   EOS calculated successfully. EOS Risk Factor: 0.5/1000 live births ([facility] national incidence); GA=37w4d; Temp=98.4; ROM=3.067; GBS='Negative'; Antibiotics='No antibiotics or any antibiotics < 2 hrs prior to birth'

## 2023-12-05 NOTE — OB PROVIDER H&P - NSHPPHYSICALEXAM_GEN_ALL_CORE
VITALS   T(C): 36.7 (12-05-23 @ 04:54), Max: 36.9 (12-05-23 @ 04:40)  HR: 84 (12-05-23 @ 04:54) (84 - 84)  BP: 126/78 (12-05-23 @ 04:54) (126/78 - 126/78)  RR: 17 (12-05-23 @ 04:54) (17 - 17)    Gen: NAD, well-appearing  Heart: S1 S2, RRR  Lungs: CTAB  Abd: soft, gravid  Ext: non-edematous, non-tender   SVE: 0.5/30/-3  SSE: gross pooling, nitrazine positive    FHT: 130bpm, +accels, no decels, mod variability   [place]: q8 minutes VITALS   T(C): 36.7 (12-05-23 @ 04:54), Max: 36.9 (12-05-23 @ 04:40)  HR: 84 (12-05-23 @ 04:54) (84 - 84)  BP: 126/78 (12-05-23 @ 04:54) (126/78 - 126/78)  RR: 17 (12-05-23 @ 04:54) (17 - 17)    Gen: NAD, well-appearing  Heart: S1 S2, RRR  Lungs: CTAB  Abd: soft, gravid  Ext: non-edematous, non-tender   SVE: 0.5/30/-3  SSE: gross pooling, nitrazine positive    FHT: 130bpm, +accels, no decels, mod variability   [place]: q8 minutes

## 2023-12-05 NOTE — OB RN TRIAGE NOTE - FALL HARM RISK - UNIVERSAL INTERVENTIONS
Bed in lowest position, wheels locked, appropriate side rails in place/Call bell, personal items and telephone in reach/Instruct patient to call for assistance before getting out of bed or chair/Non-slip footwear when patient is out of bed/[place] to call system/Physically safe environment - no spills, clutter or unnecessary equipment/Purposeful Proactive Rounding/Room/bathroom lighting operational, light cord in reach Bed in lowest position, wheels locked, appropriate side rails in place/Call bell, personal items and telephone in reach/Instruct patient to call for assistance before getting out of bed or chair/Non-slip footwear when patient is out of bed/[place] to call system/Physically safe environment - no spills, clutter or unnecessary equipment/Purposeful Proactive Rounding/Room/bathroom lighting operational, light cord in reach

## 2023-12-05 NOTE — OB RN PATIENT PROFILE - BREASTFEEDING PROVIDES MATERNAL HEALTH BENEFITS, DECREASED PREMENOPAUSAL BREAST CANCER, OVARIAN CANCER AND TYPE II DIABETES MELLITUS
" progress note      Akua Torres  [id number]  1946     LOS: 1 day     Attending: Darrin New MD    Primary Care Provider: Salty Hernandez MD      Chief Complaint/Reason for visit:  Right hip pain    Subjective   Doing ok. Increased pain after PT. Complains of numbness and lack of  to right hand postoperatively, able to move first finger. Denies f/c/n/v/sob/cp.  (Symptoms of numbness and weakness of the right hand/upper extremity persist.  CT scan is unremarkable.  No headache no vision problems no other focal symptoms.)wy    Objective     Vital Signs  Visit Vitals  /60   Pulse 83   Temp 97.7 °F (36.5 °C) (Oral)   Resp 16   Ht 162.6 cm (64\")   Wt 86.2 kg (190 lb)   SpO2 96%   BMI 32.61 kg/m²     Temp (24hrs), Av.7 °F (36.5 °C), Min:97 °F (36.1 °C), Max:99.1 °F (37.3 °C)      Nutrition: PO    Respiratory: RA    Physical Therapy: Pt increased gait distance to 10 feet with MinAx2 and RW. Pt still with R hand numbness/weakness, will attempt use of platform walker this PM. Pt requiring assist of 2 for all functional mobility, continue to recommend rehab. Pt will need OT consult    Physical Exam:     General Appearance:    Alert, cooperative, in no acute distress   Head:    Normocephalic, without obvious abnormality, atraumatic    Lungs:     Normal effort, symmetric chest rise, no crepitus, clear to      auscultation bilaterally             Heart:    Regular rhythm and normal rate, normal S1 and S2   Abdomen:     Normal bowel sounds, no masses, no organomegaly, soft        non-tender, non-distended, no guarding, no rebound                tenderness   Extremities:   Right hip Prineo CDI.     Pulses:   Pulses palpable and equal bilaterally   Skin:   No bleeding, bruising or rash   Neurologic:   Cranial nerves 2 - 12 grossly intact. Weak  and numbness right hand, some drift.      Results Review:     I reviewed the patient's new clinical results.   Results from last 7 days   Lab " Units 09/10/19  0336   WBC 10*3/mm3 12.20*   HEMOGLOBIN g/dL 10.2*   HEMATOCRIT % 33.9*   PLATELETS 10*3/mm3 172   Results for ERYN STEVE (MRN [id number]) as of 9/10/2019 10:22   Ref. Range 9/15/2018 01:26   Hemoglobin Latest Ref Range: 11.5 - 15.5 g/dL 12.1   Hematocrit Latest Ref Range: 34.5 - 44.0 % 38.1     Results from last 7 days   Lab Units 09/10/19  0337   SODIUM mmol/L 141   POTASSIUM mmol/L 5.0   CHLORIDE mmol/L 103   CO2 mmol/L 27.0   BUN mg/dL 29*   CREATININE mg/dL 1.47*   CALCIUM mg/dL 7.6*   GLUCOSE mg/dL 158*     I reviewed the patient's new imaging including images and reports.    All medications reviewed.     aspirin 325 mg Oral Daily   atenolol 25 mg Oral BID   docusate sodium 100 mg Oral BID   escitalopram 20 mg Oral Daily   mirtazapine 15 mg Oral Nightly       Assessment/Plan     Status post total replacement of right hip    Arthritis of right hip    Anxiety and depression    HTN (hypertension)    Renal insufficiency    HO A-fib (CMS/HCC)    YUNIOR treated with BiPAP    RUE weakness/numbness    Plan  1. PT/OT- WBAT RLE  2. Pain control-prns   3. IS-encouraged  4. DVT proph- mechs/ASA  5. Bowel regimen  6. Monitor post-op labs  7. DC planning for home    RUE weakness  - stat CT head  - tele monitor  - neuro consult    RI  - DC lisinopril  - continue IVF    HTN, aifb  - Continue home atenolol   - Monitor BP   - Holding parameters for BP meds  - Labetalol PRN for SBP>170     YUNIOR  - bipap at night      WILFREDO Kramer  09/10/19  10:19 AM     I have personally performed the evaluation on this patient. My history is consistent  with HPI obtained. My exam findings are listed above. I have personally reviewed and discussed the above formulated treatment plan with WILFREDO.  Discussed with pt and with . Will continue ASA. Check MRI. Consider further stroke work up. wy.   Statement Selected

## 2023-12-05 NOTE — OB PROVIDER DELIVERY SUMMARY - NSPROVIDERDELIVERYNOTE_OBGYN_ALL_OB_FT
Pt taken to the OR for rpt C/S at 37+ wks for H/O C/S x 2 and PROM (then early labor) prior to scheduled C/S date.  Rpt C/S performed by Dr MANA Montalvo, assist Dr DULCE MARIA Montes, second assist Dr ADAIR Pt taken to the OR for rpt C/S at 37+ wks for H/O C/S x 2 and PROM (then early labor) prior to scheduled C/S date.  Rpt C/S performed by Dr MANA Montalvo, assist Dr DULCE MARIA Montes, second assist Dr MANA Stafford under spinal anesthesia.  Delivered live male infant, VTX, through clear amniotic fluid at 6:34am.  Nuchal cord x 1 manually reduced.  [place] APGAR 9/9, weight 7lb Pt taken to the OR for rpt C/S at 37+ wks for H/O C/S x 2 and PROM (then early labor) prior to scheduled C/S date.  Rpt C/S performed by Dr MANA Montalvo, assist Dr DULCE MARIA Montes, second assist Dr MANA Stafford under spinal anesthesia.  Delivered live male infant, VTX, through clear amniotic fluid at 6:34am.  Nuchal cord x 1 manually reduced.  [place] APGAR 9/9, weight 7lb Pt taken to the OR for rpt C/S at 37+ wks for H/O C/S x 2 and PROM (then early labor) prior to scheduled C/S date.  Rpt C/S performed by Dr MANA Montalvo, assist Dr DULCE MARIA Montes, second assist Dr MANA Stafford under spinal anesthesia.  Delivered live male infant, VTX, through clear amniotic fluid at 6:34am.  Nuchal cord x 1 manually reduced.  [place] APGAR 9/9, weight 7lb 3oz.  Uterus with dense adhesions at lower segment to bladder and abd wall.  ++Adhesions at fascial/peritoneal layers to ant abd wall.  B/L tubes and ovaries WNL.  [place] cord blood and tissue collection, with placenta performed.  No intraoperative complications.  Baby later admitted to [facility], mother stable. Pt taken to the OR for rpt C/S at 37+ wks for H/O C/S x 2 and PROM (then early labor) prior to scheduled C/S date.  Rpt C/S performed by Dr MANA Montalvo, assist Dr DULCE MARIA Montes, second assist Dr MANA Stafford under spinal anesthesia.  Delivered live male infant, VTX, through clear amniotic fluid at 6:34am.  Nuchal cord x 1 manually reduced.  [place] APGAR 9/9, weight 7lb 3oz.  Uterus with dense adhesions at lower segment to bladder and abd wall.  ++Adhesions at fascial/peritoneal layers to ant abd wall.  B/L tubes and ovaries WNL.  Life Bank cord blood and tissue collection, with placenta performed.  No intraoperative complications.  Baby later admitted to [facility], mother stable.

## 2023-12-05 NOTE — OB PROVIDER DELIVERY SUMMARY - NSSELHIDDEN_OBGYN_ALL_OB_FT
[NS_DeliveryAttending1_OBGYN_ALL_OB_FT:[id number]==],[NS_DeliveryAttending2_OBGYN_ALL_OB_FT:[id number]==],[NS_DeliveryRN_OBGYN_ALL_OB_FT:[id number]=],[NS_DeliveryAssist1_OBGYN_ALL_OB_FT:[id number]=] [NS_DeliveryAttending1_OBGYN_ALL_OB_FT:[id number]==],[NS_DeliveryAttending2_OBGYN_ALL_OB_FT:[id number]==],[NS_DeliveryRN_OBGYN_ALL_OB_FT:[id number]=],[NS_DeliveryAssist1_OBGYN_ALL_OB_FT:[id number]=]

## 2023-12-05 NOTE — OB PROVIDER H&P - NSLOWPPHRISK_OBGYN_A_OB
[place] Pregnancy/Less than or equal to 4 previous vaginal births/No known bleeding disorder/No history of postpartum hemorrhage

## 2023-12-05 NOTE — OB RN DELIVERY SUMMARY - NSSELHIDDEN_OBGYN_ALL_OB_FT
[NS_DeliveryAttending1_OBGYN_ALL_OB_FT:[id number]==],[NS_DeliveryAttending2_OBGYN_ALL_OB_FT:[id number]==],[NS_DeliveryRN_OBGYN_ALL_OB_FT:[id number]=],[NS_DeliveryAssist1_OBGYN_ALL_OB_FT:[id number]=] [NS_DeliveryAttending1_OBGYN_ALL_OB_FT:[id number]==],[NS_DeliveryAttending2_OBGYN_ALL_OB_FT:[id number]==],[NS_DeliveryRN_OBGYN_ALL_OB_FT:[id number]=],[NS_DeliveryAssist1_OBGYN_ALL_OB_FT:[id number]=]

## 2023-12-05 NOTE — OB RN PATIENT PROFILE - FALL HARM RISK - UNIVERSAL INTERVENTIONS
Bed in lowest position, wheels locked, appropriate side rails in place/Call bell, personal items and telephone in reach/Instruct patient to call for assistance before getting out of bed or chair/Non-slip footwear when patient is out of bed/[place] to call system/Physically safe environment - no spills, clutter or unnecessary equipment/Purposeful Proactive Rounding/Room/bathroom lighting operational, light cord in reach Bed in lowest position, wheels locked, appropriate side rails in place/Call bell, personal items and telephone in reach/Instruct patient to call for assistance before getting out of bed or chair/Non-slip footwear when patient is out of bed/[place] to call system/Physically safe environment - no spills, clutter or unnecessary equipment/Purposeful Proactive Rounding/Room/bathroom lighting operational, light cord in reach

## 2023-12-05 NOTE — OB RN INTRAOPERATIVE NOTE - NSSELHIDDEN_OBGYN_ALL_OB_FT
[NS_DeliveryAttending1_OBGYN_ALL_OB_FT:[id number]==],[NS_DeliveryAttending2_OBGYN_ALL_OB_FT:[id number]==],[NS_DeliveryRN_OBGYN_ALL_OB_FT:[id number]=] [NS_DeliveryAttending1_OBGYN_ALL_OB_FT:[id number]==],[NS_DeliveryAttending2_OBGYN_ALL_OB_FT:[id number]==],[NS_DeliveryRN_OBGYN_ALL_OB_FT:[id number]=] [NS_DeliveryAttending1_OBGYN_ALL_OB_FT:[id number]==],[NS_DeliveryAttending2_OBGYN_ALL_OB_FT:[id number]==],[NS_DeliveryRN_OBGYN_ALL_OB_FT:[id number]=],[NS_DeliveryAssist1_OBGYN_ALL_OB_FT:[id number]=] [NS_DeliveryAttending1_OBGYN_ALL_OB_FT:[id number]==],[NS_DeliveryAttending2_OBGYN_ALL_OB_FT:[id number]==],[NS_DeliveryRN_OBGYN_ALL_OB_FT:[id number]=],[NS_DeliveryAssist1_OBGYN_ALL_OB_FT:[id number]=]

## 2023-12-06 VITALS
HEART RATE: 66 BPM | TEMPERATURE: 98 F | SYSTOLIC BLOOD PRESSURE: 97 MMHG | DIASTOLIC BLOOD PRESSURE: 62 MMHG | RESPIRATION RATE: 18 BRPM | OXYGEN SATURATION: 98 %

## 2023-12-06 LAB
BASOPHILS # BLD AUTO: 0.03 K/UL — SIGNIFICANT CHANGE UP (ref 0–0.2)
BASOPHILS # BLD AUTO: 0.03 K/UL — SIGNIFICANT CHANGE UP (ref 0–0.2)
BASOPHILS NFR BLD AUTO: 0.3 % — SIGNIFICANT CHANGE UP (ref 0–2)
BASOPHILS NFR BLD AUTO: 0.3 % — SIGNIFICANT CHANGE UP (ref 0–2)
EOSINOPHIL # BLD AUTO: 0.16 K/UL — SIGNIFICANT CHANGE UP (ref 0–0.5)
EOSINOPHIL # BLD AUTO: 0.16 K/UL — SIGNIFICANT CHANGE UP (ref 0–0.5)
EOSINOPHIL NFR BLD AUTO: 1.6 % — SIGNIFICANT CHANGE UP (ref 0–6)
EOSINOPHIL NFR BLD AUTO: 1.6 % — SIGNIFICANT CHANGE UP (ref 0–6)
HCT VFR BLD CALC: 31 % — LOW (ref 34.5–45)
HCT VFR BLD CALC: 31 % — LOW (ref 34.5–45)
HGB BLD-MCNC: 10.1 G/DL — LOW (ref 11.5–15.5)
HGB BLD-MCNC: 10.1 G/DL — LOW (ref 11.5–15.5)
IMM GRANULOCYTES NFR BLD AUTO: 0.5 % — SIGNIFICANT CHANGE UP (ref 0–0.9)
IMM GRANULOCYTES NFR BLD AUTO: 0.5 % — SIGNIFICANT CHANGE UP (ref 0–0.9)
LYMPHOCYTES # BLD AUTO: 1.98 K/UL — SIGNIFICANT CHANGE UP (ref 1–3.3)
LYMPHOCYTES # BLD AUTO: 1.98 K/UL — SIGNIFICANT CHANGE UP (ref 1–3.3)
LYMPHOCYTES # BLD AUTO: 19.3 % — SIGNIFICANT CHANGE UP (ref 13–44)
LYMPHOCYTES # BLD AUTO: 19.3 % — SIGNIFICANT CHANGE UP (ref 13–44)
MCHC RBC-ENTMCNC: 26.7 PG — LOW (ref 27–34)
MCHC RBC-ENTMCNC: 26.7 PG — LOW (ref 27–34)
MCHC RBC-ENTMCNC: 32.6 GM/DL — SIGNIFICANT CHANGE UP (ref 32–36)
MCHC RBC-ENTMCNC: 32.6 GM/DL — SIGNIFICANT CHANGE UP (ref 32–36)
MCV RBC AUTO: 82 FL — SIGNIFICANT CHANGE UP (ref 80–100)
MCV RBC AUTO: 82 FL — SIGNIFICANT CHANGE UP (ref 80–100)
MONOCYTES # BLD AUTO: 0.78 K/UL — SIGNIFICANT CHANGE UP (ref 0–0.9)
MONOCYTES # BLD AUTO: 0.78 K/UL — SIGNIFICANT CHANGE UP (ref 0–0.9)
MONOCYTES NFR BLD AUTO: 7.6 % — SIGNIFICANT CHANGE UP (ref 2–14)
MONOCYTES NFR BLD AUTO: 7.6 % — SIGNIFICANT CHANGE UP (ref 2–14)
NEUTROPHILS # BLD AUTO: 7.27 K/UL — SIGNIFICANT CHANGE UP (ref 1.8–7.4)
NEUTROPHILS # BLD AUTO: 7.27 K/UL — SIGNIFICANT CHANGE UP (ref 1.8–7.4)
NEUTROPHILS NFR BLD AUTO: 70.7 % — SIGNIFICANT CHANGE UP (ref 43–77)
NEUTROPHILS NFR BLD AUTO: 70.7 % — SIGNIFICANT CHANGE UP (ref 43–77)
PLATELET # BLD AUTO: 189 K/UL — SIGNIFICANT CHANGE UP (ref 150–400)
PLATELET # BLD AUTO: 189 K/UL — SIGNIFICANT CHANGE UP (ref 150–400)
RBC # BLD: 3.78 M/UL — LOW (ref 3.8–5.2)
RBC # BLD: 3.78 M/UL — LOW (ref 3.8–5.2)
RBC # FLD: 15 % — HIGH (ref 10.3–14.5)
RBC # FLD: 15 % — HIGH (ref 10.3–14.5)
WBC # BLD: 10.27 K/UL — SIGNIFICANT CHANGE UP (ref 3.8–10.5)
WBC # BLD: 10.27 K/UL — SIGNIFICANT CHANGE UP (ref 3.8–10.5)
WBC # FLD AUTO: 10.27 K/UL — SIGNIFICANT CHANGE UP (ref 3.8–10.5)
WBC # FLD AUTO: 10.27 K/UL — SIGNIFICANT CHANGE UP (ref 3.8–10.5)

## 2023-12-06 PROCEDURE — 36415 COLL VENOUS BLD VENIPUNCTURE: CPT

## 2023-12-06 PROCEDURE — 59050 FETAL MONITOR W/REPORT: CPT

## 2023-12-06 PROCEDURE — 86803 HEPATITIS C AB TEST: CPT

## 2023-12-06 PROCEDURE — 85025 COMPLETE CBC W/AUTO DIFF WBC: CPT

## 2023-12-06 PROCEDURE — 86780 TREPONEMA PALLIDUM: CPT

## 2023-12-06 PROCEDURE — 86850 RBC ANTIBODY SCREEN: CPT

## 2023-12-06 PROCEDURE — 86901 BLOOD TYPING SEROLOGIC RH(D): CPT

## 2023-12-06 PROCEDURE — 86900 BLOOD TYPING SEROLOGIC ABO: CPT

## 2023-12-06 RX ORDER — IBUPROFEN 200 MG
1 TABLET ORAL
Qty: 20 | Refills: 0
Start: 2023-12-06 | End: 2023-12-10

## 2023-12-06 RX ORDER — ACETAMINOPHEN 500 MG
3 TABLET ORAL
Qty: 60 | Refills: 0
Start: 2023-12-06 | End: 2023-12-10

## 2023-12-06 RX ORDER — IBUPROFEN 200 MG
600 TABLET ORAL EVERY 6 HOURS
Refills: 0 | Status: DISCONTINUED | OUTPATIENT
Start: 2023-12-06 | End: 2023-12-06

## 2023-12-06 RX ADMIN — Medication 975 MILLIGRAM(S): at 08:13

## 2023-12-06 RX ADMIN — Medication 600 MILLIGRAM(S): at 17:18

## 2023-12-06 RX ADMIN — Medication 975 MILLIGRAM(S): at 15:12

## 2023-12-06 RX ADMIN — Medication 30 MILLIGRAM(S): at 05:26

## 2023-12-06 RX ADMIN — Medication 600 MILLIGRAM(S): at 12:00

## 2023-12-06 RX ADMIN — Medication 975 MILLIGRAM(S): at 02:16

## 2023-12-06 NOTE — DISCHARGE NOTE OB - PATIENT PORTAL LINK FT
You can access the FollowMyHealth Patient Portal offered by [facility] by registering at the following website: [email or web address]. By joining [employer]’s FollowMyHealth portal, you will also be able to view your health information using other applications (apps) compatible with our system. You can access the FollowMyHealth Patient Portal offered by [facility] by registering at the following website: [email or web address]. By joining [email or web address]’s FollowMyHealth portal, you will also be able to view your health information using other applications (apps) compatible with our system.

## 2023-12-06 NOTE — DISCHARGE NOTE OB - MEDICATION SUMMARY - MEDICATIONS TO TAKE
I will START or STAY ON the medications listed below when I get home from the hospital:    ibuprofen 600 mg oral tablet  -- 1 tab(s) by mouth every 6 hours as needed for  moderate pain  -- Indication: For pain    acetaminophen 325 mg oral tablet  -- 3 tab(s) by mouth every 6 hours as needed for  moderate pain  -- Indication: For pain

## 2023-12-06 NOTE — PROGRESS NOTE ADULT - ATTENDING COMMENTS
Agree with Dr Stafford's assessment and plan.  Pt is a  is a 33y  now POD#1 s/p repeat  section at 37 weeks gestation for PROM, uncomplicated.  She feels well on POD 1 and is without complaints.  Her pain is well controlled and her incision is c/d/i  will continue routine PO care.    IRENA Gore

## 2023-12-06 NOTE — PROGRESS NOTE ADULT - SUBJECTIVE AND OBJECTIVE BOX
33y Female s/p c section under spinal anesthesia with duramorph for post op analgesia on 12/05/2023    Vital Signs     T(C): 36.6 (06 Dec 2023 04:24), Max: 36.9 (05 Dec 2023 15:24)  T(F): 97.9 (06 Dec 2023 04:24), Max: 98.4 (05 Dec 2023 15:24)  HR: 66 (06 Dec 2023 04:24) (66 - 91)  BP: 97/62 (06 Dec 2023 04:24) (95/58 - 118/74)  BP(mean): --  RR: 18 (06 Dec 2023 04:24) (18 - 18)  SpO2: 98% (06 Dec 2023 04:24) (96% - 100%)    Parameters below as of 06 Dec 2023 04:24    Patient On (Oxygen Delivery Method): room air            Patient's overall anesthesia satisfaction: Positive    Patients pain is well controlled     No pruritis at this time    Patient seen and doing well     No headache      No residual numbness or weakness, sensory and motor function intact    No anesthetic complications or complaints noted or reported          .

## 2023-12-06 NOTE — DISCHARGE NOTE OB - CARE PROVIDER_API CALL
Yesy Montalvo  Obstetrics and Gynecology  [address] [place] [address], [place] [address]  Phone: [phone]  Fax: [phone]  Established Patient  Follow Up Time: 1-3 days   Yesy Montalvo  Obstetrics and Gynecology  [address] [place] [address], [place] [address]  Phone: [phone]  Fax: [phone]  Established Patient  Follow Up Time: 1-3 days

## 2023-12-06 NOTE — PROGRESS NOTE ADULT - SUBJECTIVE AND OBJECTIVE BOX
AN TORRES is a 33y  now POD#1 s/p repeat  section at 37 weeks gestation for PROM, uncomplicated.    S:    No acute events overnight.   The patient has no complaints.  Pain controlled with current treatment regimen.   She is ambulating without difficulty and tolerating PO.   + flatus/-BM/+ voiding   She endorses appropriate lochia, which is decreasing.   She denies fevers, chills, nausea and vomiting.   She denies lightheadedness, dizziness, palpitations, chest pain and SOB.     O:    T(C): 36.6 (23 @ 04:24), Max: 36.9 (23 @ 15:24)  HR: 66 (23 @ 04:24) (66 - 91)  BP: 97/62 (23 @ 04:24) (95/58 - 118/74)  RR: 18 (23 @ 04:24) (18 - 18)  SpO2: 98% (23 @ 04:24) (96% - 100%)    Gen: NAD, AOx3  Abdomen:  Soft, non-tender, non-distended, +bowel sounds  Incision: Well approximated with staples in place  Uterus:  Fundus firm below umbilicus  VE:  Expected lochia  Ext:  Bilateral lower extremities non-tender and non-edematous                          12.2   6.89  )-----------( 210      ( 05 Dec 2023 05:09 )             35.4

## 2023-12-06 NOTE — PROGRESS NOTE ADULT - ASSESSMENT
A/P:  33y  now POD#1 s/p repeat  section at 37 weeks gestation for PROM, uncomplicated.  -Vital signs stable  -Hgb: 12.2 -> AM labs pending   -Voiding, tolerating PO, bowel function nml   -Advance care as tolerated   -Continue routine postpartum and postoperative care and education  -Healthy male infant, desires circumcision  - DVT ppx: Lovenox ordered/ Ambulation encouraged. SCDs while in bed.   -Dispo: Patient to be discharged when meeting all postpartum and postoperative milestones and pending attending approval.

## 2023-12-06 NOTE — DISCHARGE NOTE OB - PLAN OF CARE
Please call your provider in 3 days for wound check and to remove staples. Take medications as directed, regular diet, activity as tolerated. Exclusive breast feeding for the first 6 months is recommended. Nothing per vagina for 6 weeks (incl. sex, douching, etc). If you have additional concerns, please inform your provider.

## 2023-12-06 NOTE — DISCHARGE NOTE OB - CARE PLAN
1 Principal Discharge DX:	Delivery of pregnancy by  section  Assessment and plan of treatment:	Please call your provider in 3 days for wound check and to remove staples. Take medications as directed, regular diet, activity as tolerated. Exclusive breast feeding for the first 6 months is recommended. Nothing per vagina for 6 weeks (incl. sex, douching, etc). If you have additional concerns, please inform your provider.

## 2023-12-06 NOTE — DISCHARGE NOTE OB - NS MD DC FALL RISK RISK
For information on Fall & Injury Prevention, visit: [email or web address] OR  [email or web address] OR  https://www.cdc.gov/steadi/patient.html For information on Fall & Injury Prevention, visit: [email or web address] OR  [email or web address] OR  https://www.cdc.gov/steadi/patient.html

## 2023-12-06 NOTE — DISCHARGE NOTE OB - HOSPITAL COURSE
She is now a  who presented for repeat  section at 37w4d for PROM. She had an uncomplicated surgery and postpartum course. Upon discharge she was afebrile, passing gas, tolerating PO, ambulating, and voiding spontaneously.

## 2024-01-16 NOTE — OB NEONATOLOGY/PEDIATRICIAN DELIVERY SUMMARY - NSPEDSNEONOTESA_OBGYN_ALL_OB_FT
New patient, generally doing well. History:  Requested by Dr. Montalvo to attend this Repeat C/S delivery  . Mother had + PNC is a 32 y/o  at 39 wks gestation.  Blood type B positive, HIV negative, Rubella Immune, GBS negative, Serology non reactive HBsAg non reactive. Maternal history not significant .                                        Labor and Delivery: Infant born on vertex presentation one cord around the neck, no meconium seen at delivery, good cry spontaneously. Transfer to warmer  orally suctioned and  dried.  Infant received an Apgar score of 9 and 9. Physical exam was done is wnl and showed to FOB. Full term female AGA. Transfer to NBN for routine care under management of PMD .

## 2024-03-01 PROBLEM — Z78.9 OTHER SPECIFIED HEALTH STATUS: Chronic | Status: ACTIVE | Noted: 2023-12-05

## 2024-03-14 ENCOUNTER — LABORATORY RESULT (OUTPATIENT)
Age: 34
End: 2024-03-14

## 2024-03-14 ENCOUNTER — APPOINTMENT (OUTPATIENT)
Dept: GASTROENTEROLOGY | Facility: CLINIC | Age: 34
End: 2024-03-14
Payer: COMMERCIAL

## 2024-03-14 VITALS
SYSTOLIC BLOOD PRESSURE: 120 MMHG | DIASTOLIC BLOOD PRESSURE: 78 MMHG | BODY MASS INDEX: 31.16 KG/M2 | WEIGHT: 187 LBS | HEIGHT: 65 IN | HEART RATE: 82 BPM | OXYGEN SATURATION: 98 %

## 2024-03-14 DIAGNOSIS — K52.9 NONINFECTIVE GASTROENTERITIS AND COLITIS, UNSPECIFIED: ICD-10-CM

## 2024-03-14 DIAGNOSIS — K85.90 ACUTE PANCREATITIS WITHOUT NECROSIS OR INFECTION, UNSPECIFIED: ICD-10-CM

## 2024-03-14 DIAGNOSIS — R16.0 HEPATOMEGALY, NOT ELSEWHERE CLASSIFIED: ICD-10-CM

## 2024-03-14 DIAGNOSIS — R10.13 EPIGASTRIC PAIN: ICD-10-CM

## 2024-03-14 DIAGNOSIS — E87.6 HYPOKALEMIA: ICD-10-CM

## 2024-03-14 PROCEDURE — 99204 OFFICE O/P NEW MOD 45 MIN: CPT

## 2024-03-14 NOTE — PHYSICAL EXAM
[Alert] : alert [Normal Voice/Communication] : normal voice/communication [Healthy Appearing] : healthy appearing [No Acute Distress] : no acute distress [Sclera] : the sclera and conjunctiva were normal [Hearing Threshold Finger Rub Not [place]] : hearing was normal [Normal Lips/Gums] : the lips and gums were normal [Oropharynx] : the oropharynx was normal [Normal Appearance] : the appearance of the neck was normal [No Neck Mass] : no neck mass was observed [No Respiratory Distress] : no respiratory distress [No Acc Muscle Use] : no accessory muscle use [Respiration, Rhythm And Depth] : normal respiratory rhythm and effort [Auscultation Breath Sounds / Voice Sounds] : lungs were clear to auscultation bilaterally [Heart Rate And Rhythm] : heart rate was normal and rhythm regular [Normal S1, S2] : normal S1 and S2 [Murmurs] : no murmurs [Bowel Sounds] : normal bowel sounds [No Masses] : no abdominal mass palpated [Abdomen Tenderness] : non-tender [Abdomen Soft] : soft [] : no hepatosplenomegaly [Oriented To Time, Place, And Person] : oriented to person, place, and time

## 2024-03-14 NOTE — ASSESSMENT
[FreeTextEntry1] : Patient with ruq and epigastric pain  hx choleycstectomy  blood panel to exclude cbd stone abdominal ultrasonography

## 2024-03-18 LAB
ALBUMIN SERPL ELPH-MCNC: 4.6 G/DL
ALP BLD-CCNC: 65 U/L
ALT SERPL-CCNC: 14 U/L
ANION GAP SERPL CALC-SCNC: 16 MMOL/L
AST SERPL-CCNC: 14 U/L
BILIRUB SERPL-MCNC: 0.3 MG/DL
BUN SERPL-MCNC: 18 MG/DL
CALCIUM SERPL-MCNC: 10.1 MG/DL
CHLORIDE SERPL-SCNC: 103 MMOL/L
CO2 SERPL-SCNC: 24 MMOL/L
CREAT SERPL-MCNC: 0.65 MG/DL
CRP SERPL-MCNC: 6 MG/L
EGFR: 118 ML/MIN/1.73M2
GLUCOSE SERPL-MCNC: 94 MG/DL
LPL SERPL-CCNC: 53 U/L
POTASSIUM SERPL-SCNC: 4.5 MMOL/L
PROT SERPL-MCNC: 7.4 G/DL
SODIUM SERPL-SCNC: 142 MMOL/L

## 2024-03-28 ENCOUNTER — APPOINTMENT (OUTPATIENT)
Dept: GASTROENTEROLOGY | Facility: CLINIC | Age: 34
End: 2024-03-28

## 2024-06-12 NOTE — OB RN PREOPERATIVE CHECKLIST - WAS PATIENT ON BETA BLOCKER?
Maya Morales was seen and treated in our emergency department on 6/12/2024.                Diagnosis:     Maya  may return to work on return date.    She may return on this date: 06/20/2024         If you have any questions or concerns, please don't hesitate to call.      ENMANUEL Michael    ______________________________           _______________          _______________  Hospital Representative                              Date                                Time No

## 2024-08-22 ENCOUNTER — APPOINTMENT (OUTPATIENT)
Dept: GASTROENTEROLOGY | Facility: CLINIC | Age: 34
End: 2024-08-22
Payer: COMMERCIAL

## 2024-08-22 ENCOUNTER — LABORATORY RESULT (OUTPATIENT)
Age: 34
End: 2024-08-22

## 2024-08-22 VITALS
WEIGHT: 190 LBS | HEIGHT: 65 IN | OXYGEN SATURATION: 100 % | DIASTOLIC BLOOD PRESSURE: 75 MMHG | SYSTOLIC BLOOD PRESSURE: 111 MMHG | BODY MASS INDEX: 31.65 KG/M2 | HEART RATE: 73 BPM

## 2024-08-22 DIAGNOSIS — R16.0 HEPATOMEGALY, NOT ELSEWHERE CLASSIFIED: ICD-10-CM

## 2024-08-22 DIAGNOSIS — K90.0 CELIAC DISEASE: ICD-10-CM

## 2024-08-22 DIAGNOSIS — K57.32 DIVERTICULITIS OF LARGE INTESTINE W/OUT PERFORATION OR ABSCESS W/OUT BLEEDING: ICD-10-CM

## 2024-08-22 DIAGNOSIS — R10.13 EPIGASTRIC PAIN: ICD-10-CM

## 2024-08-22 PROCEDURE — 99214 OFFICE O/P EST MOD 30 MIN: CPT

## 2024-08-22 RX ORDER — AZITHROMYCIN 250 MG/1
250 TABLET, FILM COATED ORAL
Qty: 6 | Refills: 2 | Status: ACTIVE | COMMUNITY
Start: 2024-08-22 | End: 1900-01-01

## 2024-08-22 NOTE — PHYSICAL EXAM

## 2024-08-22 NOTE — ASSESSMENT
[FreeTextEntry1] : Patient presents for Gastroenterology followup appointment because of alteration in bowel habits abdominal pain and tenesmus   Differential diagnosis would include idiopthic inflammatory bowel disease   Low fodmpa diet reviewed with patient   new prescription sent to pharmacy for azithromycin

## 2024-08-23 LAB
CRP SERPL-MCNC: 7 MG/L
HCT VFR BLD CALC: 39.8 %
HGB BLD-MCNC: 12.9 G/DL
MCHC RBC-ENTMCNC: 27.3 PG
MCHC RBC-ENTMCNC: 32.4 GM/DL
MCV RBC AUTO: 84.3 FL
PLATELET # BLD AUTO: 289 K/UL
RBC # BLD: 4.72 M/UL
RBC # FLD: 13.5 %
WBC # FLD AUTO: 5.66 K/UL

## 2024-08-24 LAB
BAKER'S YEAST AB QL: 23.8 UNITS
BAKER'S YEAST IGA QL IA: 17.4 UNITS
BAKER'S YEAST IGA QN IA: NEGATIVE
BAKER'S YEAST IGG QN IA: ABNORMAL

## 2024-08-26 LAB — CELIACPAN: NORMAL

## 2024-09-12 ENCOUNTER — APPOINTMENT (OUTPATIENT)
Dept: GASTROENTEROLOGY | Facility: CLINIC | Age: 34
End: 2024-09-12
Payer: COMMERCIAL

## 2024-09-12 VITALS
SYSTOLIC BLOOD PRESSURE: 115 MMHG | WEIGHT: 187 LBS | DIASTOLIC BLOOD PRESSURE: 78 MMHG | BODY MASS INDEX: 31.16 KG/M2 | HEIGHT: 65 IN | HEART RATE: 76 BPM | OXYGEN SATURATION: 98 %

## 2024-09-12 DIAGNOSIS — K52.9 NONINFECTIVE GASTROENTERITIS AND COLITIS, UNSPECIFIED: ICD-10-CM

## 2024-09-12 DIAGNOSIS — R10.13 EPIGASTRIC PAIN: ICD-10-CM

## 2024-09-12 PROCEDURE — 99215 OFFICE O/P EST HI 40 MIN: CPT

## 2024-09-12 RX ORDER — AZITHROMYCIN 250 MG/1
250 TABLET, FILM COATED ORAL
Qty: 6 | Refills: 2 | Status: ACTIVE | COMMUNITY
Start: 2024-09-12 | End: 1900-01-01

## 2024-09-12 NOTE — HISTORY OF PRESENT ILLNESS
[FreeTextEntry1] : Chief complaint: complex case of likely inflammatory bowel disease   HPI: Patient presents for Gastroenterology followup for a number of complex Gastrointestinal issues. She has markedly positive serologies for anca and elevated inflammatory markers indicating likely idiopathic inflammatory bowel disease. Her options for medications are somewhat limited as she is nursing her 9 month old infant. She just completed a course of azithromycin, which resulted in some interval improvement in her gastrointestinal signs and symptoms.   New prescription written for azithromycin. I had an extensive discussion with the patient that after she is no longer nursing the infant she would likely be a candidate for biological medication for her idiopathic inflammatory bowel disease.   I reviewed extensive prior medical records on the patient including prior laboratory reports, hospital reports, reports of operation and prior medical imaging reports   high degree of complexity of medical decision making involved in this patient encounter

## 2024-09-12 NOTE — PHYSICAL EXAM
[Alert] : alert [Normal Voice/Communication] : normal voice/communication [Healthy Appearing] : healthy appearing [No Acute Distress] : no acute distress [Hearing Threshold Finger Rub Not [place]] : hearing was normal [Sclera] : the sclera and conjunctiva were normal [Normal Lips/Gums] : the lips and gums were normal [Oropharynx] : the oropharynx was normal [Normal Appearance] : the appearance of the neck was normal [No Neck Mass] : no neck mass was observed [No Respiratory Distress] : no respiratory distress [No Acc Muscle Use] : no accessory muscle use [Respiration, Rhythm And Depth] : normal respiratory rhythm and effort [Auscultation Breath Sounds / Voice Sounds] : lungs were clear to auscultation bilaterally [Heart Rate And Rhythm] : heart rate was normal and rhythm regular [Normal S1, S2] : normal S1 and S2 [Murmurs] : no murmurs [Abdomen Tenderness] : non-tender [Bowel Sounds] : normal bowel sounds [No Masses] : no abdominal mass palpated [Abdomen Soft] : soft [] : no hepatosplenomegaly [Oriented To Time, Place, And Person] : oriented to person, place, and time

## 2024-09-12 NOTE — ASSESSMENT
[FreeTextEntry1] : Patient presents for evaluation and management of multiple complex gastrointestinal and medical signs and symptoms. Likely has idiopathic inflammatory bowel disease. Repeat course of azithromycin given   I extensively reviewed patient's prior records including prior laboratory results medical imaging and hospital reports.   I discussed with the patient that after she is no longer nursing the infant, she likely would be a candidate for biological medications for the inflammatory bowel disease

## 2024-11-12 NOTE — DISCHARGE NOTE OB - CAREGIVER NAME
Recommendations (Free Text): Patient will schedule for limited venous ultrasound in one week post ablation of the treated GSV.\\nContact information for Dr. Boby Murphy's Vein Team was given to the patient for any questions or concerns following the procedure. \\n\\nPOST CARE INSTRUCTIONS:\\n*Activity: You MUST walk at least 10 minutes every hour you are awake for the first 48 hours. You do not have to wake up at night to walk. You MUST keep compression stockings on for 48 hours straight. Steri-strips can be removed 5 days post ablation. Avoid strenuous exercises and weight lifting over 30lbs for 48 hours. Avoid flying or any long car trips for 2 weeks post procedure.\\n\\n*Compression and Wound Care: You may remove the compression stocking the morning of the second day after surgery. That is when you can shower as normal. If you are not having pain, you do not have to continue wearing the compression stockings. If you are having discomfort, the stockings may help and patient should continue to wear during the day for another 1-2 weeks, You may perform side lunges for thigh and calf stretching post ablation and Massaging the treated area, applying warm compress can also help with any discomfort.\\n\\n*Return Appointment: After the procedure, a return visit within 7 days is essential. An ultrasound examination will be done to confirm the successful treatment of your vein and to evaluate for any complications.\\n\\n*Bathing and Showering: After 48 hours, you may shower as normal, but still must avoid submersing in hot tubs, jacuzzi's or steam rooms for 1 week. Clean your surgery sites during showering and when you are finished bathing, pat your leg dry with a towel and repeat wound care instructions. For two weeks after treatment avoid submersing in hot tubs or hot baths.\\n\\n*Discomfort: Any pain or discomfort should decrease with each day after surgery. You may take over the counter medicines such as Ibuprofen (400mg every 8 hours) or Tylenol (500mg every 6 hours) with food to alleviate any soreness, tightness and numbness which is normal after procedure and should dissipate over the next 1-2 weeks. \\n\\nIf your pain is severe or not improving, please contact Dr. Murphy's Clinical Team. Recommendation Preamble: The following recommendations were made during the visit: Detail Level: Zone Patient Management Risk Assessment: Moderate Render Risk Assessment In Note?: yes Lucien

## 2024-11-29 NOTE — DISCHARGE NOTE OB - PROVIDER TOKENS
Alert-The patient is alert, awake and responds to voice. The patient is oriented to time, place, and person. The triage nurse is able to obtain subjective information.
PROVIDER:[TOKEN:[[id number]],FOLLOWUP:[1-3 days],ESTABLISHEDPATIENT:[T]]

## 2025-01-06 ENCOUNTER — APPOINTMENT (OUTPATIENT)
Dept: GASTROENTEROLOGY | Facility: CLINIC | Age: 35
End: 2025-01-06

## 2025-01-17 ENCOUNTER — APPOINTMENT (OUTPATIENT)
Dept: GASTROENTEROLOGY | Facility: CLINIC | Age: 35
End: 2025-01-17
Payer: COMMERCIAL

## 2025-01-17 ENCOUNTER — APPOINTMENT (OUTPATIENT)
Dept: CT IMAGING | Facility: CLINIC | Age: 35
End: 2025-01-17
Payer: COMMERCIAL

## 2025-01-17 ENCOUNTER — OUTPATIENT (OUTPATIENT)
Dept: OUTPATIENT SERVICES | Facility: HOSPITAL | Age: 35
LOS: 1 days | End: 2025-01-17
Payer: COMMERCIAL

## 2025-01-17 VITALS
DIASTOLIC BLOOD PRESSURE: 82 MMHG | BODY MASS INDEX: 32.65 KG/M2 | OXYGEN SATURATION: 98 % | SYSTOLIC BLOOD PRESSURE: 132 MMHG | WEIGHT: 196 LBS | HEIGHT: 65 IN | HEART RATE: 75 BPM

## 2025-01-17 DIAGNOSIS — K57.32 DIVERTICULITIS OF LARGE INTESTINE W/OUT PERFORATION OR ABSCESS W/OUT BLEEDING: ICD-10-CM

## 2025-01-17 DIAGNOSIS — R16.0 HEPATOMEGALY, NOT ELSEWHERE CLASSIFIED: ICD-10-CM

## 2025-01-17 DIAGNOSIS — Z98.891 HISTORY OF UTERINE SCAR FROM PREVIOUS SURGERY: Chronic | ICD-10-CM

## 2025-01-17 DIAGNOSIS — R10.13 EPIGASTRIC PAIN: ICD-10-CM

## 2025-01-17 DIAGNOSIS — Z90.49 ACQUIRED ABSENCE OF OTHER SPECIFIED PARTS OF DIGESTIVE TRACT: Chronic | ICD-10-CM

## 2025-01-17 DIAGNOSIS — Z90.89 ACQUIRED ABSENCE OF OTHER ORGANS: Chronic | ICD-10-CM

## 2025-01-17 DIAGNOSIS — R10.814 LEFT LOWER QUADRANT ABDOMINAL TENDERNESS: ICD-10-CM

## 2025-01-17 PROBLEM — R11.0 NAUSEA: Status: ACTIVE | Noted: 2025-01-17

## 2025-01-17 PROCEDURE — 74177 CT ABD & PELVIS W/CONTRAST: CPT

## 2025-01-17 PROCEDURE — 74177 CT ABD & PELVIS W/CONTRAST: CPT | Mod: 26

## 2025-01-17 PROCEDURE — 99215 OFFICE O/P EST HI 40 MIN: CPT

## 2025-01-17 RX ORDER — PROCHLORPERAZINE MALEATE 10 MG/1
10 TABLET ORAL
Qty: 21 | Refills: 1 | Status: ACTIVE | COMMUNITY
Start: 2025-01-17 | End: 1900-01-01

## 2025-01-17 RX ORDER — DICYCLOMINE HYDROCHLORIDE 20 MG/1
20 TABLET ORAL
Qty: 90 | Refills: 3 | Status: ACTIVE | COMMUNITY
Start: 2025-01-17 | End: 1900-01-01

## 2025-01-17 RX ORDER — LEVOFLOXACIN 500 MG/1
500 TABLET, FILM COATED ORAL DAILY
Qty: 10 | Refills: 2 | Status: ACTIVE | COMMUNITY
Start: 2025-01-17 | End: 1900-01-01

## 2025-01-23 ENCOUNTER — APPOINTMENT (OUTPATIENT)
Dept: GASTROENTEROLOGY | Facility: CLINIC | Age: 35
End: 2025-01-23
Payer: COMMERCIAL

## 2025-01-23 VITALS
HEART RATE: 85 BPM | DIASTOLIC BLOOD PRESSURE: 87 MMHG | OXYGEN SATURATION: 99 % | SYSTOLIC BLOOD PRESSURE: 131 MMHG | WEIGHT: 196 LBS | BODY MASS INDEX: 32.65 KG/M2 | HEIGHT: 65 IN

## 2025-01-23 DIAGNOSIS — K52.9 NONINFECTIVE GASTROENTERITIS AND COLITIS, UNSPECIFIED: ICD-10-CM

## 2025-01-23 DIAGNOSIS — K20.90 ESOPHAGITIS, UNSPECIFIED WITHOUT BLEEDING: ICD-10-CM

## 2025-01-23 DIAGNOSIS — R10.814 LEFT LOWER QUADRANT ABDOMINAL TENDERNESS: ICD-10-CM

## 2025-01-23 DIAGNOSIS — Z87.19 PERSONAL HISTORY OF OTHER DISEASES OF THE DIGESTIVE SYSTEM: ICD-10-CM

## 2025-01-23 DIAGNOSIS — K90.0 CELIAC DISEASE: ICD-10-CM

## 2025-01-23 DIAGNOSIS — R11.0 NAUSEA: ICD-10-CM

## 2025-01-23 PROCEDURE — 99214 OFFICE O/P EST MOD 30 MIN: CPT

## 2025-01-23 RX ORDER — SODIUM SULFATE, MAGNESIUM SULFATE, AND POTASSIUM CHLORIDE 17.75; 2.7; 2.25 G/1; G/1; G/1
1479-225-188 TABLET ORAL
Qty: 24 | Refills: 0 | Status: ACTIVE | COMMUNITY
Start: 2025-01-23 | End: 1900-01-01

## 2025-02-26 ENCOUNTER — RESULT REVIEW (OUTPATIENT)
Age: 35
End: 2025-02-26

## 2025-02-26 ENCOUNTER — APPOINTMENT (OUTPATIENT)
Dept: GASTROENTEROLOGY | Facility: GI CENTER | Age: 35
End: 2025-02-26
Payer: COMMERCIAL

## 2025-02-26 PROCEDURE — 43239 EGD BIOPSY SINGLE/MULTIPLE: CPT

## 2025-02-26 PROCEDURE — 45380 COLONOSCOPY AND BIOPSY: CPT

## 2025-03-04 RX ORDER — GLYCOPYRROLATE 1 MG/1
1 TABLET ORAL
Qty: 60 | Refills: 5 | Status: ACTIVE | COMMUNITY
Start: 2025-03-04 | End: 1900-01-01

## 2025-03-17 ENCOUNTER — APPOINTMENT (OUTPATIENT)
Dept: GASTROENTEROLOGY | Facility: CLINIC | Age: 35
End: 2025-03-17

## 2025-05-30 NOTE — HISTORY OF PRESENT ILLNESS
Pt rcvd 30 day supply from [facility] due to being out. Needs maintenance supply to [facility].  
[FreeTextEntry1] : chief complaint: abdominal pain   HPI: Patient presents for Gastroenterology follow up visit because of complex signs and symptoms of abdominal pain. Patient with positive family history of idiopathic inflammatory bowel disease. Patient with intermittent crampy rlq and llq abdominal pain. There are no exacerbating or ameliorating factors. The abdominal pain radiates to the aback at times. Associated symptoms include nausea   Will order new bloodwork for IBD serology and c reactive protein  New prescription sent to pharmacy for azithromycin  low fodmpap diet reviewed with patient  Patient's prior medical records were extensively reviewed